# Patient Record
Sex: FEMALE | Race: WHITE | NOT HISPANIC OR LATINO | Employment: PART TIME | URBAN - METROPOLITAN AREA
[De-identification: names, ages, dates, MRNs, and addresses within clinical notes are randomized per-mention and may not be internally consistent; named-entity substitution may affect disease eponyms.]

---

## 2022-06-01 ENCOUNTER — APPOINTMENT (EMERGENCY)
Dept: ULTRASOUND IMAGING | Facility: HOSPITAL | Age: 22
DRG: 948 | End: 2022-06-01
Payer: COMMERCIAL

## 2022-06-01 ENCOUNTER — HOSPITAL ENCOUNTER (INPATIENT)
Facility: HOSPITAL | Age: 22
LOS: 1 days | Discharge: HOME | DRG: 948 | End: 2022-06-03
Attending: EMERGENCY MEDICINE | Admitting: GENERAL PRACTICE
Payer: COMMERCIAL

## 2022-06-01 ENCOUNTER — APPOINTMENT (EMERGENCY)
Dept: CT IMAGING | Facility: HOSPITAL | Age: 22
DRG: 948 | End: 2022-06-01
Payer: COMMERCIAL

## 2022-06-01 DIAGNOSIS — R10.11 RUQ PAIN: Primary | ICD-10-CM

## 2022-06-01 DIAGNOSIS — R74.01 TRANSAMINITIS: ICD-10-CM

## 2022-06-01 DIAGNOSIS — R10.10 UPPER ABDOMINAL PAIN: ICD-10-CM

## 2022-06-01 DIAGNOSIS — R10.84 GENERALIZED ABDOMINAL PAIN: ICD-10-CM

## 2022-06-01 LAB
ALBUMIN SERPL BCP-MCNC: 4.4 G/DL (ref 3.5–5)
ALP SERPL-CCNC: 101 U/L (ref 34–104)
ALT SERPL W P-5'-P-CCNC: 65 U/L (ref 7–52)
ANION GAP SERPL CALCULATED.3IONS-SCNC: 7 MMOL/L (ref 4–13)
AST SERPL W P-5'-P-CCNC: 136 U/L (ref 13–39)
BACTERIA UR QL AUTO: NORMAL /HPF
BASOPHILS # BLD AUTO: 0.03 THOUSANDS/ΜL (ref 0–0.1)
BASOPHILS NFR BLD AUTO: 0 % (ref 0–1)
BILIRUB SERPL-MCNC: 1 MG/DL (ref 0.2–1)
BILIRUB UR QL STRIP: NEGATIVE
BUN SERPL-MCNC: 10 MG/DL (ref 5–25)
CALCIUM SERPL-MCNC: 9.1 MG/DL (ref 8.4–10.2)
CHLORIDE SERPL-SCNC: 103 MMOL/L (ref 96–108)
CLARITY UR: CLEAR
CO2 SERPL-SCNC: 26 MMOL/L (ref 21–32)
COLOR UR: YELLOW
CREAT SERPL-MCNC: 0.72 MG/DL (ref 0.6–1.3)
D DIMER PPP FEU-MCNC: 1.75 UG/ML FEU
EOSINOPHIL # BLD AUTO: 0.09 THOUSAND/ΜL (ref 0–0.61)
EOSINOPHIL NFR BLD AUTO: 1 % (ref 0–6)
ERYTHROCYTE [DISTWIDTH] IN BLOOD BY AUTOMATED COUNT: 12.9 % (ref 11.6–15.1)
EXT PREG TEST URINE: NEGATIVE
EXT. CONTROL ED NAV: NORMAL
FLUAV RNA RESP QL NAA+PROBE: NEGATIVE
FLUBV RNA RESP QL NAA+PROBE: NEGATIVE
GFR SERPL CREATININE-BSD FRML MDRD: 120 ML/MIN/1.73SQ M
GLUCOSE SERPL-MCNC: 89 MG/DL (ref 65–140)
GLUCOSE UR STRIP-MCNC: NEGATIVE MG/DL
HCT VFR BLD AUTO: 43.7 % (ref 34.8–46.1)
HGB BLD-MCNC: 14.5 G/DL (ref 11.5–15.4)
HGB UR QL STRIP.AUTO: NEGATIVE
IMM GRANULOCYTES # BLD AUTO: 0.07 THOUSAND/UL (ref 0–0.2)
IMM GRANULOCYTES NFR BLD AUTO: 1 % (ref 0–2)
KETONES UR STRIP-MCNC: NEGATIVE MG/DL
LEUKOCYTE ESTERASE UR QL STRIP: ABNORMAL
LIPASE SERPL-CCNC: 26 U/L (ref 11–82)
LYMPHOCYTES # BLD AUTO: 1.57 THOUSANDS/ΜL (ref 0.6–4.47)
LYMPHOCYTES NFR BLD AUTO: 12 % (ref 14–44)
MCH RBC QN AUTO: 28.9 PG (ref 26.8–34.3)
MCHC RBC AUTO-ENTMCNC: 33.2 G/DL (ref 31.4–37.4)
MCV RBC AUTO: 87 FL (ref 82–98)
MONOCYTES # BLD AUTO: 0.64 THOUSAND/ΜL (ref 0.17–1.22)
MONOCYTES NFR BLD AUTO: 5 % (ref 4–12)
NEUTROPHILS # BLD AUTO: 11.1 THOUSANDS/ΜL (ref 1.85–7.62)
NEUTS SEG NFR BLD AUTO: 81 % (ref 43–75)
NITRITE UR QL STRIP: NEGATIVE
NON-SQ EPI CELLS URNS QL MICRO: NORMAL /HPF
NRBC BLD AUTO-RTO: 0 /100 WBCS
PH UR STRIP.AUTO: 5.5 [PH] (ref 4.5–8)
PLATELET # BLD AUTO: 212 THOUSANDS/UL (ref 149–390)
PMV BLD AUTO: 8.8 FL (ref 8.9–12.7)
POTASSIUM SERPL-SCNC: 4.9 MMOL/L (ref 3.5–5.3)
PROT SERPL-MCNC: 7.4 G/DL (ref 6.4–8.4)
PROT UR STRIP-MCNC: NEGATIVE MG/DL
RBC # BLD AUTO: 5.01 MILLION/UL (ref 3.81–5.12)
RBC #/AREA URNS AUTO: NORMAL /HPF
RSV RNA RESP QL NAA+PROBE: NEGATIVE
SARS-COV-2 RNA RESP QL NAA+PROBE: NEGATIVE
SODIUM SERPL-SCNC: 136 MMOL/L (ref 135–147)
SP GR UR STRIP.AUTO: 1.02 (ref 1–1.03)
UROBILINOGEN UR QL STRIP.AUTO: 0.2 E.U./DL
WBC # BLD AUTO: 13.5 THOUSAND/UL (ref 4.31–10.16)
WBC #/AREA URNS AUTO: NORMAL /HPF

## 2022-06-01 PROCEDURE — 85025 COMPLETE CBC W/AUTO DIFF WBC: CPT | Performed by: EMERGENCY MEDICINE

## 2022-06-01 PROCEDURE — 96374 THER/PROPH/DIAG INJ IV PUSH: CPT

## 2022-06-01 PROCEDURE — 76705 ECHO EXAM OF ABDOMEN: CPT | Performed by: EMERGENCY MEDICINE

## 2022-06-01 PROCEDURE — 96375 TX/PRO/DX INJ NEW DRUG ADDON: CPT

## 2022-06-01 PROCEDURE — 99284 EMERGENCY DEPT VISIT MOD MDM: CPT | Performed by: EMERGENCY MEDICINE

## 2022-06-01 PROCEDURE — 85379 FIBRIN DEGRADATION QUANT: CPT | Performed by: EMERGENCY MEDICINE

## 2022-06-01 PROCEDURE — 36415 COLL VENOUS BLD VENIPUNCTURE: CPT | Performed by: EMERGENCY MEDICINE

## 2022-06-01 PROCEDURE — 74176 CT ABD & PELVIS W/O CONTRAST: CPT

## 2022-06-01 PROCEDURE — 83690 ASSAY OF LIPASE: CPT | Performed by: EMERGENCY MEDICINE

## 2022-06-01 PROCEDURE — 81025 URINE PREGNANCY TEST: CPT | Performed by: EMERGENCY MEDICINE

## 2022-06-01 PROCEDURE — 80053 COMPREHEN METABOLIC PANEL: CPT | Performed by: EMERGENCY MEDICINE

## 2022-06-01 PROCEDURE — 0241U HB NFCT DS VIR RESP RNA 4 TRGT: CPT

## 2022-06-01 PROCEDURE — 81001 URINALYSIS AUTO W/SCOPE: CPT

## 2022-06-01 PROCEDURE — 96361 HYDRATE IV INFUSION ADD-ON: CPT

## 2022-06-01 PROCEDURE — 99285 EMERGENCY DEPT VISIT HI MDM: CPT

## 2022-06-01 PROCEDURE — G1004 CDSM NDSC: HCPCS

## 2022-06-01 PROCEDURE — 76705 ECHO EXAM OF ABDOMEN: CPT

## 2022-06-01 PROCEDURE — 99204 OFFICE O/P NEW MOD 45 MIN: CPT | Performed by: PHYSICIAN ASSISTANT

## 2022-06-01 RX ORDER — KETOROLAC TROMETHAMINE 30 MG/ML
15 INJECTION, SOLUTION INTRAMUSCULAR; INTRAVENOUS ONCE
Status: COMPLETED | OUTPATIENT
Start: 2022-06-01 | End: 2022-06-01

## 2022-06-01 RX ORDER — PAROXETINE 30 MG/1
30 TABLET, FILM COATED ORAL EVERY MORNING
COMMUNITY

## 2022-06-01 RX ORDER — MORPHINE SULFATE 4 MG/ML
4 INJECTION, SOLUTION INTRAMUSCULAR; INTRAVENOUS ONCE
Status: COMPLETED | OUTPATIENT
Start: 2022-06-01 | End: 2022-06-01

## 2022-06-01 RX ORDER — ONDANSETRON 2 MG/ML
4 INJECTION INTRAMUSCULAR; INTRAVENOUS EVERY 6 HOURS PRN
Status: DISCONTINUED | OUTPATIENT
Start: 2022-06-01 | End: 2022-06-03 | Stop reason: HOSPADM

## 2022-06-01 RX ORDER — PAROXETINE HYDROCHLORIDE 20 MG/1
30 TABLET, FILM COATED ORAL EVERY MORNING
Status: DISCONTINUED | OUTPATIENT
Start: 2022-06-02 | End: 2022-06-03 | Stop reason: HOSPADM

## 2022-06-01 RX ORDER — DIPHENHYDRAMINE HCL 25 MG
25 TABLET ORAL ONCE
Status: COMPLETED | OUTPATIENT
Start: 2022-06-01 | End: 2022-06-01

## 2022-06-01 RX ORDER — SIMETHICONE 80 MG
80 TABLET,CHEWABLE ORAL 4 TIMES DAILY PRN
Status: DISCONTINUED | OUTPATIENT
Start: 2022-06-01 | End: 2022-06-03 | Stop reason: HOSPADM

## 2022-06-01 RX ADMIN — SODIUM CHLORIDE 1000 ML: 0.9 INJECTION, SOLUTION INTRAVENOUS at 12:30

## 2022-06-01 RX ADMIN — DIPHENHYDRAMINE HCL 25 MG: 25 TABLET, COATED ORAL at 14:12

## 2022-06-01 RX ADMIN — KETOROLAC TROMETHAMINE 15 MG: 30 INJECTION, SOLUTION INTRAMUSCULAR at 12:29

## 2022-06-01 RX ADMIN — MORPHINE SULFATE 4 MG: 4 INJECTION INTRAVENOUS at 14:24

## 2022-06-01 NOTE — ED PROVIDER NOTES
History  Chief Complaint   Patient presents with    Abdominal Pain     [facility], sent here for further work up       History provided by:  Medical records, patient and relative   used: No    Abdominal Pain  Pain location:  RUQ  Pain quality: sharp and stabbing    Pain radiates to:  Does not radiate  Pain severity:  Severe  Onset quality:  Sudden  Duration:  3 hours  Timing:  Constant  Progression:  Unchanged  Chronicity:  New  Context: not previous surgeries, not sick contacts, not suspicious food intake and not trauma    Relieved by:  Nothing  Worsened by:  Nothing  Ineffective treatments:  None tried  Associated symptoms: nausea    Associated symptoms: no anorexia, no chest pain, no chills, no constipation, no cough, no diarrhea, no dysuria, no fever, no hematemesis, no hematochezia, no hematuria, no melena, no shortness of breath, no sore throat, no vaginal discharge and no vomiting    Risk factors: not pregnant and no recent hospitalization        Prior to Admission Medications   Prescriptions Last Dose Informant Patient Reported? Taking? PARoxetine (PAXIL) 30 mg tablet 6/1/2022 at Unknown time  Yes Yes   Sig: Take 30 mg by mouth every morning      Facility-Administered Medications: None       History reviewed  No pertinent past medical history  History reviewed  No pertinent surgical history  History reviewed  No pertinent family history  I have reviewed and agree with the history as documented  E-Cigarette/Vaping     E-Cigarette/Vaping Substances     Social History     Tobacco Use    Smoking status: Never Smoker    Smokeless tobacco: Never Used   Substance Use Topics    Alcohol use: Not Currently    Drug use: Not Currently       Review of Systems   Constitutional: Negative for chills and fever  HENT: Negative for congestion, rhinorrhea and sore throat  Eyes: Negative for visual disturbance     Respiratory: Negative for cough and shortness of breath  Cardiovascular: Negative for chest pain  Gastrointestinal: Positive for abdominal pain and nausea  Negative for anorexia, constipation, diarrhea, hematemesis, hematochezia, melena and vomiting  Genitourinary: Negative for dysuria, frequency, hematuria, vaginal discharge and vaginal pain  Skin: Negative for rash  Neurological: Negative for weakness and numbness  All other systems reviewed and are negative  Physical Exam  Physical Exam  Vitals and nursing note reviewed  Constitutional:       General: She is in acute distress  Appearance: She is well-developed  She is not diaphoretic  HENT:      Head: Normocephalic and atraumatic  Mouth/Throat:      Mouth: Mucous membranes are moist    Eyes:      General: No scleral icterus  Conjunctiva/sclera: Conjunctivae normal       Pupils: Pupils are equal, round, and reactive to light  Cardiovascular:      Rate and Rhythm: Normal rate and regular rhythm  Heart sounds: Normal heart sounds  No murmur heard  Pulmonary:      Effort: Pulmonary effort is normal  No respiratory distress  Breath sounds: Normal breath sounds  Abdominal:      General: Abdomen is flat  Palpations: Abdomen is soft  Tenderness: There is abdominal tenderness in the right upper quadrant  There is no right CVA tenderness, left CVA tenderness, guarding or rebound  Negative signs include Floyd's sign  Musculoskeletal:         General: No deformity  Normal range of motion  Cervical back: Normal range of motion  Skin:     General: Skin is warm and dry  Capillary Refill: Capillary refill takes less than 2 seconds  Neurological:      Mental Status: She is alert and oriented to person, place, and time  Psychiatric:         Mood and Affect: Mood is anxious  Behavior: Behavior normal          Thought Content:  Thought content normal          Judgment: Judgment normal          Vital Signs  ED Triage Vitals [06/01/22 1115]   Temperature Pulse Respirations Blood Pressure SpO2   97 9 °F (36 6 °C) 80 18 125/64 97 %      Temp Source Heart Rate Source Patient Position - Orthostatic VS BP Location FiO2 (%)   Oral Monitor Lying Left arm --      Pain Score       10 - Worst Possible Pain           Vitals:    06/01/22 1115 06/01/22 1428   BP: 125/64 124/78   Pulse: 80 89   Patient Position - Orthostatic VS: Lying Lying         Visual Acuity      ED Medications  Medications   ketorolac (TORADOL) injection 15 mg (15 mg Intravenous Given 6/1/22 1229)   sodium chloride 0 9 % bolus 1,000 mL (0 mL Intravenous Stopped 6/1/22 1500)   diphenhydrAMINE (BENADRYL) tablet 25 mg (25 mg Oral Given 6/1/22 1412)   morphine (PF) 4 mg/mL injection 4 mg (4 mg Intravenous Given 6/1/22 1424)       Diagnostic Studies  Results Reviewed     Procedure Component Value Units Date/Time    Lipase [[id number]]  (Normal) Collected: 06/01/22 1148    Lab Status: Final result Specimen: Blood from Arm, Right Updated: 06/01/22 1227     Lipase 26 u/L     Comprehensive metabolic panel [[id number]]  (Abnormal) Collected: 06/01/22 1148    Lab Status: Final result Specimen: Blood from Arm, Right Updated: 06/01/22 1227     Sodium 136 mmol/L      Potassium 4 9 mmol/L      Chloride 103 mmol/L      CO2 26 mmol/L      ANION GAP 7 mmol/L      BUN 10 mg/dL      Creatinine 0 72 mg/dL      Glucose 89 mg/dL      Calcium 9 1 mg/dL       U/L      ALT 65 U/L      Alkaline Phosphatase 101 U/L      Total Protein 7 4 g/dL      Albumin 4 4 g/dL      Total Bilirubin 1 00 mg/dL      eGFR 120 ml/min/1 73sq m     Narrative:      [place] guidelines for Chronic Kidney Disease (CKD):     Stage 1 with normal or high GFR (GFR > 90 mL/min/1 73 square meters)    Stage 2 Mild CKD (GFR = 60-89 mL/min/1 73 square meters)    Stage 3A Moderate CKD (GFR = 45-59 mL/min/1 73 square meters)    Stage 3B Moderate CKD (GFR = 30-44 mL/min/1 73 square meters)    Stage 4 Severe CKD (GFR = 15-29 mL/min/1 73 square meters)    Stage 5 End Stage CKD (GFR <15 mL/min/1 73 square meters)  Note: GFR calculation is accurate only with a steady state creatinine    Urine Microscopic [[id number]]  (Normal) Collected: 06/01/22 1150    Lab Status: Final result Specimen: Urine, Clean Catch Updated: 06/01/22 1221     RBC, UA 0-1 /hpf      WBC, UA 2-4 /hpf      Epithelial Cells Occasional /hpf      Bacteria, UA Occasional /hpf     CBC and differential [[id number]]  (Abnormal) Collected: 06/01/22 1148    Lab Status: Final result Specimen: Blood from Arm, Right Updated: 06/01/22 1201     WBC 13 50 Thousand/uL      RBC 5 01 Million/uL      Hemoglobin 14 5 g/dL      Hematocrit 43 7 %      MCV 87 fL      MCH 28 9 pg      MCHC 33 2 g/dL      RDW 12 9 %      MPV 8 8 fL      Platelets 643 Thousands/uL      nRBC 0 /100 WBCs      Neutrophils Relative 81 %      Immat GRANS % 1 %      Lymphocytes Relative 12 %      Monocytes Relative 5 %      Eosinophils Relative 1 %      Basophils Relative 0 %      Neutrophils Absolute 11 10 Thousands/µL      Immature Grans Absolute 0 07 Thousand/uL      Lymphocytes Absolute 1 57 Thousands/µL      Monocytes Absolute 0 64 Thousand/µL      Eosinophils Absolute 0 09 Thousand/µL      Basophils Absolute 0 03 Thousands/µL     POCT pregnancy, urine [[id number]]  (Normal) Resulted: 06/01/22 1153    Lab Status: Final result Updated: 06/01/22 1154     EXT PREG TEST UR (Ref: Negative) Negative     Control VAlid    Urine Macroscopic, POC [[id number]]  (Abnormal) Collected: 06/01/22 1150    Lab Status: Final result Specimen: Urine Updated: 06/01/22 1152     Color, UA Yellow     Clarity, UA Clear     pH, UA 5 5     Leukocytes, UA Small     Nitrite, UA Negative     Protein, UA Negative mg/dl      Glucose, UA Negative mg/dl      Ketones, UA Negative mg/dl      Urobilinogen, UA 0 2 E U /dl      Bilirubin, UA Negative     Blood, UA Negative     Specific Gravity, UA 1 020    Narrative:      CLINITEK RESULT US right upper quadrant   Final Result by Jeremy Mcginnis MD (06/01 1344)      Minimal gallbladder wall thickening without gallstones or dilatation of the gallbladder  Finding most likely is related to underlying liver disease and is not suggestive of acute cholecystitis  Workstation performed: [id number]         CT abdomen pelvis wo contrast    (Results Pending)              Procedures  POC Biliary US    Date/Time: 6/1/2022 4:05 PM  Performed by: Penelope Kenyon MD  Authorized by: Penelope Kenyon MD     Patient location:  ED  Performed by: Attending  Other Assisting Provider: No    Procedure details:     Exam Type:  Diagnostic    Assessment for:  Cholecystitis and cholelithiasis    Views obtained: gallbladder (transverse and longitudinal), common bile duct and portal triad      Image quality: diagnostic      Image availability:  Images available in PACS  Findings:     Cholelithiasis: identified      Common bile duct:  Normal    Gallbladder wall:  Abnormal    Gallbladder wall thickened (>3 mm): yes      Pericholecystic fluid: not identified      Sonographic Floyd's sign: negative    Interpretation:     Biliary ultrasound impressions: indeterminate               ED Course  ED Course as of 06/01/22 1602   Wed Jun 01, 2022   1155 POCT pregnancy, urine  Not preg   1155 Urine Macroscopic, POC(!)  Small LE, otherwise wnl   1203 CBC and differential(!)  Mild leukocytosis reassuring diff, normal H/H, normal platelets  1221 Urine Microscopic  wnl   1238 Lipase: 26  wnl   1238 Comprehensive metabolic panel(!)  Minimal elevation of AST/ALT, T Bili wnl   1238 Lipase: 26   1347 US right upper quadrant  IMPRESSION:     Minimal gallbladder wall thickening without gallstones or dilatation of the gallbladder  Finding most likely is related to underlying liver disease and is not suggestive of acute cholecystitis      Workstation performed: [id number]      1355 Improved but persistent pain    Will consult surgery, possible passed stone? [place]?  Pt also developed some swelling just below left eye, no swelling lips/tongue/wheezing/sob or other rashes  Will give benadryl  80 Seen by surgery, possible passed stone versus primary liver finding  No intervention on their part  Will reassess  1529 Improved but persistent pain, will PO challenge and re-eval     1600 Pt with no change  Will CT                                               MDM  Number of Diagnoses or Management Options  RUQ pain  Diagnosis management comments: Signed out to Dr Daisy Gabriel  a lowpending CT imaging  DIspo per results and re-eval         Amount and/or Complexity of Data Reviewed  Clinical lab tests: ordered and reviewed  Tests in the radiology section of CPT®: ordered and reviewed  Tests in the medicine section of CPT®: ordered and reviewed  Review and summarize past medical records: yes  Discuss the patient with other providers: yes  Independent visualization of images, tracings, or specimens: yes    Risk of Complications, Morbidity, and/or Mortality  Presenting problems: moderate  Diagnostic procedures: moderate  Management options: moderate    Patient Progress  Patient progress: stable      Disposition  Final diagnoses:   RUQ pain     Time reflects when diagnosis was documented in both MDM as applicable and the Disposition within this note     Time User Action Codes Description Comment    6/1/2022  3:22 PM Chris Quevedo Add [R10 11] RUQ pain       ED Disposition     None      Follow-up Information    None         Patient's Medications   Discharge Prescriptions    No medications on file       No discharge procedures on file      PDMP Review     None          ED Provider  Electronically Signed by           Hillary Lema MD  06/01/22 2819

## 2022-06-01 NOTE — CONSULTS
Consultation -General Surgery  Primitivo Skiff 22 y o  female MRN: [id number]  Unit/Bed#: [facility] Encounter: [id number]            ASSESSMENT:  Patient is a 22year old female with complaints of epigastric and right upper quadrant pain that began this morning  Right upper quadrant US does not suggest acute cholecystitis  Pain is unlikely biliary in origin  RUQ US - Minimal gallbladder wall thickening without gallstones or dilatation of the gallbladder  Finding most likely is related to underlying liver disease and is not suggestive of acute cholecystitis  AST - 136, ALT - 65  Alk phos - 101, T  Bili - 1 00  WBC - 13 50     Plan:  - recommend medical evaluation for further work-up of slightly elevated AST/ALT  - no surgical intervention necessary at this time  - discussed with Dr Phi Gregory      Reason for Consult / Principal Problem:    HPI: Primitivo Skiff is a 22y o  year old female who presents with epigastric and right upper quadrant abdominal pain that began this morning  Patient states that she felt nauseated but did not vomit  She states that she had a couple episodes of diarrhea on Sunday but that has since resolved  She denies fever, chest pain, sob  She states that the pain is constant but has improved after receiving pain medication  Patient denies previous similar pain  Review of Systems   Constitutional: Negative for fever  Respiratory: Negative for shortness of breath  Cardiovascular: Negative for chest pain  Gastrointestinal: Positive for abdominal pain, diarrhea and nausea  Negative for vomiting  All other systems reviewed and are negative  Historical Information   History reviewed  No pertinent past medical history  History reviewed  No pertinent surgical history    Social History   Social History     Substance and Sexual Activity   Alcohol Use Not Currently     Social History     Substance and Sexual Activity   Drug Use Not Currently     Social History     Tobacco Use   Smoking Status Never Smoker   Smokeless Tobacco Never Used     History reviewed  No pertinent family history  Meds/Allergies     (Not in a hospital admission)    No current facility-administered medications for this encounter  Allergies   Allergen Reactions    Amoxicillin GI Intolerance       Objective     Blood pressure 124/78, pulse 89, temperature 97 9 °F (36 6 °C), temperature source Oral, resp  rate 17, height 5' 8" (1 727 m), weight 70 3 kg (155 lb), last menstrual period 05/18/2022, SpO2 99 %  Intake/Output Summary (Last 24 hours) at 6/1/2022 1556  Last data filed at 6/1/2022 1500  Gross per 24 hour   Intake 1000 ml   Output --   Net 1000 ml       PHYSICAL EXAM  Physical Exam  Vitals reviewed  Constitutional:       Appearance: Normal appearance  HENT:      Head: Normocephalic and atraumatic  Right Ear: Ear canal and external ear normal       Left Ear: Ear canal and external ear normal       Nose: Nose normal       Mouth/Throat:      Mouth: Mucous membranes are moist    Eyes:      Extraocular Movements: Extraocular movements intact  Conjunctiva/sclera: Conjunctivae normal       Pupils: Pupils are equal, round, and reactive to light  Cardiovascular:      Rate and Rhythm: Normal rate and regular rhythm  Pulses: Normal pulses  Heart sounds: Normal heart sounds  Pulmonary:      Effort: Pulmonary effort is normal       Breath sounds: Normal breath sounds  Abdominal:      General: Bowel sounds are normal       Palpations: Abdomen is soft  Tenderness: There is abdominal tenderness in the right upper quadrant and epigastric area  There is no guarding or rebound  Negative signs include Floyd's sign and McBurney's sign  Skin:     General: Skin is warm  Capillary Refill: Capillary refill takes less than 2 seconds  Neurological:      General: No focal deficit present  Mental Status: She is alert and oriented to person, place, and time     Psychiatric: Mood and Affect: Mood normal          Behavior: Behavior normal          Thought Content: Thought content normal          Judgment: Judgment normal            Lab Results: I have personally reviewed pertinent lab results  Imaging: I have personally reviewed pertinent reports  EKG, Pathology, and Other Studies: I have personally reviewed pertinent reports  Counseling / Coordination of Care  Total time spent today  15 minutes  Greater than 50% of total time was spent with the patient and / or family counseling and / or coordination of care           Samantha Hawk PA-C  6/1/2022 3:56 PM

## 2022-06-02 ENCOUNTER — APPOINTMENT (OUTPATIENT)
Dept: CT IMAGING | Facility: HOSPITAL | Age: 22
DRG: 948 | End: 2022-06-02
Payer: COMMERCIAL

## 2022-06-02 LAB
ALBUMIN SERPL BCP-MCNC: 3.6 G/DL (ref 3.5–5)
ALP SERPL-CCNC: 110 U/L (ref 34–104)
ALT SERPL W P-5'-P-CCNC: 220 U/L (ref 7–52)
ANION GAP SERPL CALCULATED.3IONS-SCNC: 6 MMOL/L (ref 4–13)
AST SERPL W P-5'-P-CCNC: 161 U/L (ref 13–39)
BILIRUB SERPL-MCNC: 0.57 MG/DL (ref 0.2–1)
BUN SERPL-MCNC: 9 MG/DL (ref 5–25)
CALCIUM SERPL-MCNC: 8.4 MG/DL (ref 8.4–10.2)
CHLORIDE SERPL-SCNC: 109 MMOL/L (ref 96–108)
CO2 SERPL-SCNC: 24 MMOL/L (ref 21–32)
CREAT SERPL-MCNC: 0.65 MG/DL (ref 0.6–1.3)
ERYTHROCYTE [DISTWIDTH] IN BLOOD BY AUTOMATED COUNT: 13.1 % (ref 11.6–15.1)
GFR SERPL CREATININE-BSD FRML MDRD: 128 ML/MIN/1.73SQ M
GLUCOSE SERPL-MCNC: 95 MG/DL (ref 65–140)
HAV IGM SER QL: NORMAL
HBV CORE IGM SER QL: NORMAL
HBV SURFACE AG SER QL: NORMAL
HCT VFR BLD AUTO: 38.8 % (ref 34.8–46.1)
HCV AB SER QL: NORMAL
HGB BLD-MCNC: 13.1 G/DL (ref 11.5–15.4)
MCH RBC QN AUTO: 29.4 PG (ref 26.8–34.3)
MCHC RBC AUTO-ENTMCNC: 33.8 G/DL (ref 31.4–37.4)
MCV RBC AUTO: 87 FL (ref 82–98)
PLATELET # BLD AUTO: 193 THOUSANDS/UL (ref 149–390)
PMV BLD AUTO: 9 FL (ref 8.9–12.7)
POTASSIUM SERPL-SCNC: 3.9 MMOL/L (ref 3.5–5.3)
PROT SERPL-MCNC: 6.2 G/DL (ref 6.4–8.4)
RBC # BLD AUTO: 4.46 MILLION/UL (ref 3.81–5.12)
SODIUM SERPL-SCNC: 139 MMOL/L (ref 135–147)
WBC # BLD AUTO: 4.62 THOUSAND/UL (ref 4.31–10.16)

## 2022-06-02 PROCEDURE — G1004 CDSM NDSC: HCPCS

## 2022-06-02 PROCEDURE — 71275 CT ANGIOGRAPHY CHEST: CPT

## 2022-06-02 PROCEDURE — 99254 IP/OBS CNSLTJ NEW/EST MOD 60: CPT | Performed by: INTERNAL MEDICINE

## 2022-06-02 PROCEDURE — 99219 PR INITIAL OBSERVATION CARE/DAY 50 MINUTES: CPT | Performed by: GENERAL PRACTICE

## 2022-06-02 PROCEDURE — 85027 COMPLETE CBC AUTOMATED: CPT

## 2022-06-02 PROCEDURE — 80053 COMPREHEN METABOLIC PANEL: CPT

## 2022-06-02 PROCEDURE — 80074 ACUTE HEPATITIS PANEL: CPT

## 2022-06-02 RX ORDER — HYDROMORPHONE HCL IN WATER/PF 6 MG/30 ML
0.2 PATIENT CONTROLLED ANALGESIA SYRINGE INTRAVENOUS 3 TIMES DAILY PRN
Status: DISCONTINUED | OUTPATIENT
Start: 2022-06-02 | End: 2022-06-03 | Stop reason: HOSPADM

## 2022-06-02 RX ORDER — PANTOPRAZOLE SODIUM 40 MG/1
40 TABLET, DELAYED RELEASE ORAL
Status: DISCONTINUED | OUTPATIENT
Start: 2022-06-02 | End: 2022-06-03 | Stop reason: HOSPADM

## 2022-06-02 RX ORDER — SODIUM CHLORIDE, SODIUM GLUCONATE, SODIUM ACETATE, POTASSIUM CHLORIDE, MAGNESIUM CHLORIDE, SODIUM PHOSPHATE, DIBASIC, AND POTASSIUM PHOSPHATE .53; .5; .37; .037; .03; .012; .00082 G/100ML; G/100ML; G/100ML; G/100ML; G/100ML; G/100ML; G/100ML
75 INJECTION, SOLUTION INTRAVENOUS CONTINUOUS
Status: DISCONTINUED | OUTPATIENT
Start: 2022-06-02 | End: 2022-06-02

## 2022-06-02 RX ADMIN — HYDROMORPHONE HYDROCHLORIDE 0.2 MG: 0.2 INJECTION, SOLUTION INTRAMUSCULAR; INTRAVENOUS; SUBCUTANEOUS at 21:54

## 2022-06-02 RX ADMIN — PAROXETINE 30 MG: 20 TABLET, FILM COATED ORAL at 09:32

## 2022-06-02 RX ADMIN — PANTOPRAZOLE SODIUM 40 MG: 40 TABLET, DELAYED RELEASE ORAL at 13:14

## 2022-06-02 RX ADMIN — IODIXANOL 70 ML: 320 INJECTION, SOLUTION INTRAVASCULAR at 01:08

## 2022-06-02 RX ADMIN — SODIUM CHLORIDE, SODIUM GLUCONATE, SODIUM ACETATE, POTASSIUM CHLORIDE, MAGNESIUM CHLORIDE, SODIUM PHOSPHATE, DIBASIC, AND POTASSIUM PHOSPHATE 75 ML/HR: .53; .5; .37; .037; .03; .012; .00082 INJECTION, SOLUTION INTRAVENOUS at 04:29

## 2022-06-02 NOTE — CONSULTS
Consultation - [address] [facility]  Tyesha Patel 22 y o  female MRN: [id number]  Unit/Bed#: S -01 Encounter: [id number]        Consults    Reason for Consult / Principal Problem: Generalized abdominal pain    HPI: Tyesha Patel is a 22y o  year old female reporting no significant medical history who presented to the emergency room yesterday with complaint of acute onset of right upper quadrant and epigastric pain with nausea, onset suddenly occurred while she was driving     She said she was actually seen in the emergency room 2 months earlier with right lower quadrant pain for which the diagnosis was not found, she was not found to have any evidence of infection was not treated with any antibiotics, she otherwise denies starting any new medications recently  She does report history of question for celiac disease that was never confirmed, and says she has H pylori and was treated for over a year ago  Her transaminases were found to be moderately elevated with preserved alkaline phosphatase and bilirubin, CT scan and ultrasound showed some gallbladder wall thickening but no evidence of gallbladder stone it is or biliary ductal dilatation  She did also recently traveled to the [facility] [place] [facility]", stayed there for a week and came back on May 16th  A viral hepatitis panel was checked and found negative  She admits to only rare use of NSAIDs, denies alcohol use  She says she had EGD a couple of years ago in [address] [place] [address]  REVIEW OF SYSTEMS:    CONSTITUTIONAL: Denies any fever, chills, or rigors  Good appetite, and no recent weight loss  HEENT: No earache or tinnitus  Denies hearing loss or visual disturbances  CARDIOVASCULAR: No chest pain or palpitations  RESPIRATORY: Denies any cough, hemoptysis, shortness of breath or dyspnea on exertion  GASTROINTESTINAL: As noted in the History of Present Illness  GENITOURINARY: No problems with urination   Denies any hematuria or dysuria  NEUROLOGIC: No dizziness or vertigo, denies headaches  MUSCULOSKELETAL: Denies any muscle or joint pain  SKIN: Denies skin rashes or itching  ENDOCRINE: Denies excessive thirst  Denies intolerance to heat or cold  PSYCHOSOCIAL: Denies depression or anxiety  Denies any recent memory loss  Historical Information   History reviewed  No pertinent past medical history  History reviewed  No pertinent surgical history  Social History   Social History     Substance and Sexual Activity   Alcohol Use Not Currently     Social History     Substance and Sexual Activity   Drug Use Not Currently     Social History     Tobacco Use   Smoking Status Never Smoker   Smokeless Tobacco Never Used     History reviewed  No pertinent family history  Meds/Allergies     Medications Prior to Admission   Medication    PARoxetine (PAXIL) 30 mg tablet     Current Facility-Administered Medications   Medication Dose Route Frequency    HYDROmorphone HCl (DILAUDID) injection 0 2 mg  0 2 mg Intravenous TID PRN    ondansetron (ZOFRAN) injection 4 mg  4 mg Intravenous Q6H PRN    pantoprazole (PROTONIX) EC tablet 40 mg  40 mg Oral Early Morning    PARoxetine (PAXIL) tablet 30 mg  30 mg Oral QAM    simethicone (MYLICON) chewable tablet 80 mg  80 mg Oral 4x Daily PRN       Allergies   Allergen Reactions    Amoxicillin GI Intolerance           Objective     Blood pressure 114/78, pulse 96, temperature (!) 100 6 °F (38 1 °C), resp  rate 16, height 5' 8" (1 727 m), weight 70 kg (154 lb 5 2 oz), last menstrual period 05/18/2022, SpO2 97 %        Intake/Output Summary (Last 24 hours) at 6/2/2022 1143  Last data filed at 6/2/2022 1127  Gross per 24 hour   Intake 1522 5 ml   Output --   Net 1522 5 ml         PHYSICAL EXAM     General Appearance:   Alert, cooperative, no distress, appears stated age    HEENT:   Normocephalic, atraumatic, anicteric      Neck:  Supple, symmetrical, trachea midline, no adenopathy;    thyroid: no enlargement/tenderness/nodules; no carotid  bruit or JVD    Lungs:   Clear to auscultation bilaterally; no rales, rhonchi or wheezing; respirations unlabored    Heart[de-identified]   S1 and S2 normal; regular rate and rhythm; no murmur, rub, or gallop     Abdomen:   Soft, non-tender, non-distended; normal bowel sounds; no masses, no organomegaly    Genitalia:   Deferred    Rectal:   Deferred    Extremities:  No cyanosis, clubbing or edema    Pulses:  2+ and symmetric all extremities    Skin:  Skin color, texture, turgor normal, no rashes or lesions    Lymph nodes:  No palpable cervical, axillary or inguinal lymphadenopathy        Lab Results:   Admission on 06/01/2022   Component Date Value    WBC 06/01/2022 13 50 (A)    RBC 06/01/2022 5 01     Hemoglobin 06/01/2022 14 5     Hematocrit 06/01/2022 43 7     MCV 06/01/2022 87     MCH 06/01/2022 28 9     MCHC 06/01/2022 33 2     RDW 06/01/2022 12 9     MPV 06/01/2022 8 8 (A)    Platelets 54/94/1835 212     nRBC 06/01/2022 0     Neutrophils Relative 06/01/2022 81 (A)    Immat GRANS % 06/01/2022 1     Lymphocytes Relative 06/01/2022 12 (A)    Monocytes Relative 06/01/2022 5     Eosinophils Relative 06/01/2022 1     Basophils Relative 06/01/2022 0     Neutrophils Absolute 06/01/2022 11 10 (A)    Immature Grans Absolute 06/01/2022 0 07     Lymphocytes Absolute 06/01/2022 1 57     Monocytes Absolute 06/01/2022 0 64     Eosinophils Absolute 06/01/2022 0 09     Basophils Absolute 06/01/2022 0 03     Sodium 06/01/2022 136     Potassium 06/01/2022 4 9     Chloride 06/01/2022 103     CO2 06/01/2022 26     ANION GAP 06/01/2022 7     BUN 06/01/2022 10     Creatinine 06/01/2022 0 72     Glucose 06/01/2022 89     Calcium 06/01/2022 9 1     AST 06/01/2022 136 (A)    ALT 06/01/2022 65 (A)    Alkaline Phosphatase 06/01/2022 101     Total Protein 06/01/2022 7 4     Albumin 06/01/2022 4 4     Total Bilirubin 06/01/2022 1 00     eGFR 06/01/2022 120     Lipase 06/01/2022 26     EXT PREG TEST UR (Ref: N* 06/01/2022 Negative     Control 06/01/2022 VAlid     Color, UA 06/01/2022 Yellow     Clarity, UA 06/01/2022 Clear     pH, UA 06/01/2022 5 5     Leukocytes, UA 06/01/2022 Small (A)    Nitrite, UA 06/01/2022 Negative     Protein, UA 06/01/2022 Negative     Glucose, UA 06/01/2022 Negative     Ketones, UA 06/01/2022 Negative     Urobilinogen, UA 06/01/2022 0 2     Bilirubin, UA 06/01/2022 Negative     Blood, UA 06/01/2022 Negative     Specific Gravity, UA 06/01/2022 1 020     RBC, UA 06/01/2022 0-1     WBC, UA 06/01/2022 2-4     Epithelial Cells 06/01/2022 Occasional     Bacteria, UA 06/01/2022 Occasional     D-Dimer, Quant 06/01/2022 1 75 (A)    SARS-CoV-2 06/01/2022 Negative     INFLUENZA A PCR 06/01/2022 Negative     INFLUENZA B PCR 06/01/2022 Negative     RSV PCR 06/01/2022 Negative     Hepatitis B Surface Ag 06/02/2022 Non-reactive     Hep A IgM 06/02/2022 Non-reactive     Hepatitis C Ab 06/02/2022 Non-reactive     Hep B C IgM 06/02/2022 Non-reactive     WBC 06/02/2022 4 62     RBC 06/02/2022 4 46     Hemoglobin 06/02/2022 13 1     Hematocrit 06/02/2022 38 8     MCV 06/02/2022 87     MCH 06/02/2022 29 4     MCHC 06/02/2022 33 8     RDW 06/02/2022 13 1     Platelets 06/36/6360 193     MPV 06/02/2022 9 0     Sodium 06/02/2022 139     Potassium 06/02/2022 3 9     Chloride 06/02/2022 109 (A)    CO2 06/02/2022 24     ANION GAP 06/02/2022 6     BUN 06/02/2022 9     Creatinine 06/02/2022 0 65     Glucose 06/02/2022 95     Calcium 06/02/2022 8 4     AST 06/02/2022 161 (A)    ALT 06/02/2022 220 (A)    Alkaline Phosphatase 06/02/2022 110 (A)    Total Protein 06/02/2022 6 2 (A)    Albumin 06/02/2022 3 6     Total Bilirubin 06/02/2022 0 57     eGFR 06/02/2022 128        Imaging Studies: I have personally reviewed pertinent reports        CT ABDOMEN AND PELVIS WITHOUT IV CONTRAST     INDICATION:   Abdominal pain, acute, nonlocalized  RUQ pain      COMPARISON:  Right upper quadrant ultrasound from earlier today      TECHNIQUE:  CT examination of the abdomen and pelvis was performed without intravenous contrast  This examination was performed without intravenous contrast in the context of the critical nationwide Omnipaque shortage  Axial, sagittal, and coronal 2D   reformatted images were created from the source data and submitted for interpretation       Radiation dose length product (DLP) for this visit:  349 mGy-cm   This examination, like all CT scans performed in the [facility], was performed utilizing techniques to minimize radiation dose exposure, including the use of iterative   reconstruction and automated exposure control       Enteric contrast was not administered       FINDINGS:     ABDOMEN     LOWER CHEST:  No clinically significant abnormality identified in the visualized lower chest      LIVER/BILIARY TREE:  Unremarkable      GALLBLADDER:  No calcified gallstones  No pericholecystic inflammatory change      SPLEEN:  Borderline splenomegaly measuring 13 3 cm      PANCREAS:  Unremarkable      ADRENAL GLANDS:  Unremarkable      KIDNEYS/URETERS:  Unremarkable  No hydronephrosis      STOMACH AND BOWEL:  Unremarkable      APPENDIX:  No findings to suggest appendicitis      ABDOMINOPELVIC CAVITY:  No ascites  No pneumoperitoneum  No lymphadenopathy      VESSELS:  Unremarkable for patient's age      PELVIS     REPRODUCTIVE ORGANS:  IUD in satisfactory position  No adnexal mass      URINARY BLADDER:  Unremarkable      ABDOMINAL WALL/INGUINAL REGIONS:  Unremarkable      OSSEOUS STRUCTURES:  No acute fracture or destructive osseous lesion      IMPRESSION:     No acute inflammatory process identified      Borderline splenomegaly        ASSESSMENT/PLAN:     1    Acute onset of epigastric/right upper quadrant pain with elevated LFTs, nausea; imaging findings showing only mild gallbladder wall thickening with no evidence of biliary ductal dilatation or gallbladder stones  Broad differential at this time, patient reported recent travel to [facility] but acute hepatitis panel including hepatitis A serology was negative    - start Protonix once daily, treat empirically for gastritis/peptic ulcer disease    -check iron panel, autoimmune serologies including SINDY, AMA, ASMA, and celiac disease profile, she reports a history of questionable celiac disease in the past    - also check CMV and EBV titers    -monitor LFTs    -if liver enzymes appear to be increasing particularly in an obstructive pattern, would consider MRCP to rule out retained CBD stone although this appears unlikely at this time    -if transaminases continue to increase and above so urologic workup is unremarkable, can then consider liver biopsy    -if patient has epigastric/right upper quadrant pain worsens or dietary tolerance worsens despite empiric treatment for peptic ulcer disease, can also consider EGD as inpatient; otherwise can consider this as outpatient if she improves      The patient was seen and examined by Dr Aditi Lechuga, all key medical decisions were made with Dr Aditi Lechuga  Thank you for allowing us to participate in the care of this pleasant patient  We will follow up with you closely

## 2022-06-02 NOTE — H&P
[address] [facility] 2000, 22 y o  female MRN: [id number]  Unit/Bed#: S -01 Encounter: [id number]  Primary Care Provider: No primary care provider on file  Date and time admitted to hospital: 6/1/2022 11:03 AM    * Generalized abdominal pain  Assessment & Plan  POA: RUQ pain with radiation to epigastric and LUQ  Elevated liver enzymes  Imaging without findings to explain patient's symptoms  Plan  · Fevers, chills and diarrhea in the setting recent travel to [facility]  · Diarrhea has resolved but patient developed acute right upper quadrant abdominal pain  · Noted with elevated liver enzymes  Hepatocellular pattern  · Will check acute hepatitis panel  Transaminitis  Assessment & Plan  Recent Labs     06/01/22  1148   *   ALT 65*   ALKPHOS 101   TBILI 1 00       EtOH: Denies use  Drug use: No recent changes in medications  No recreational drug use  Acetaminophen: No significant use  Herbal Supplements: Denies use  Imaging  · CT Abd/Pev: No acute inflammatory process identified  Borderline splenomegaly  · U/S: Minimal gallbladder wall thickening without gallstones or dilatation of the gallbladder  Finding most likely is related to underlying liver disease and is not suggestive of acute cholecystitis  There is a hepatocellular pattern in terms of the transaminitis    Plan:  · Continue to trend LFTs  · GI non ulcerogenic diet  · Acute hepatitis panel  · Continue supportive care  Leukocytosis  Assessment & Plan  · Reports fever, chills and diarrhea 2 days prior to presentation  · Afebrile hemodynamically stable presentation  · Monitor WBC, vital signs  Elevated d-dimer  Assessment & Plan  · Elevated D-dimer in the setting of travel to [facility] 2 weeks ago  · Patient is COVID vaccinated x2 and received booster vaccine  · COVID-19 negative  · Check CT PE study      VTE Pharmacologic Prophylaxis: VTE Score: 1 Low Risk (Score 0-2) - Encourage Ambulation  Code Status: Level 1 - Full Code   Discussion with family: Updated  () at bedside  Anticipated Length of Stay: Patient will be admitted on an observation basis with an anticipated length of stay of less than 2 midnights secondary to Transaminitis, abdominal pain       Chief Complaint:  Right upper quadrant abdominal pain and nausea  History of Present Illness:    Martin Cortés is a 22 y o  female with a PMH of anxiety who presents with right upper quadrant abdominal pain and nausea  Patient was initially seen in urgent care and referred to ED for further evaluation  Patient reports fevers, chills and 1 episode of diarrhea 2 days ago which resolved without intervention  Today she developed right upper quadrant pain which was sharp/stabbing in nature radiating to epigastric and left upper quadrant  Pain was 10/10 and associated with nausea but no vomiting  Pain seemed to be worsened by deep breathing  Nothing seemed to relieve the pain  In the ED patient afebrile hemodynamically stable  Labs significant for elevated liver enzymes, leukocytosis, elevated D-dimer  CT abdomen pelvis showing borderline splenomegaly otherwise no acute inflammatory process identified  Abdominal ultrasound showing minimal gallbladder wall thickening without gallstones or dilatation of gallbladder  Findings not suggestive of acute cholecystitis  When seen patient's abdominal pain had resolved with pain medicine  She reports recent travel to [facility] from 8[place]83 - 0/36/31 has been doing well since her return  Denies sick contacts  Patient is COVID-19 vaccinated and received booster vaccine  Review of Systems:  Review of Systems   Constitutional: Negative for chills and fever  HENT: Negative for ear pain and sore throat  Eyes: Negative for pain and visual disturbance  Respiratory: Negative for cough and shortness of breath  Cardiovascular: Negative for chest pain and palpitations  Gastrointestinal: Positive for abdominal pain and diarrhea (1 episode 2 days ago)  Negative for blood in stool and vomiting  Genitourinary: Negative for dysuria and hematuria  Musculoskeletal: Negative for arthralgias and back pain  Skin: Negative for color change and rash  Neurological: Negative for seizures and syncope  All other systems reviewed and are negative  Past Medical and Surgical History:   History reviewed  No pertinent past medical history  History reviewed  No pertinent surgical history  Meds/Allergies:  Prior to Admission medications    Medication Sig Start Date End Date Taking? Authorizing Provider   PARoxetine (PAXIL) 30 mg tablet Take 30 mg by mouth every morning   Yes Historical Provider, MD DONG have reviewed home medications with patient personally  Allergies: Allergies   Allergen Reactions    Amoxicillin GI Intolerance       Social History:  Marital Status: /Civil Union   Occupation:  Unknown  Patient Pre-hospital Living Situation: Home  Patient Pre-hospital Level of Mobility: walks  Patient Pre-hospital Diet Restrictions: none  Substance Use History:   Social History     Substance and Sexual Activity   Alcohol Use Not Currently     Social History     Tobacco Use   Smoking Status Never Smoker   Smokeless Tobacco Never Used     Social History     Substance and Sexual Activity   Drug Use Not Currently       Family History:  History reviewed  No pertinent family history  Physical Exam:     Vitals:   Blood Pressure: 117/72 (06/01/22 2232)  Pulse: 78 (06/01/22 2232)  Temperature: 98 6 °F (37 °C) (06/01/22 2232)  Temp Source: Oral (06/01/22 2232)  Respirations: 16 (06/01/22 2232)  Height: 5' 8" (172 7 cm) (06/01/22 1115)  Weight - Scale: 70 kg (154 lb 5 2 oz) (06/01/22 2232)  SpO2: 97 % (06/01/22 2232)    Physical Exam  Vitals and nursing note reviewed     Constitutional:       General: She is not in acute distress  Appearance: She is well-developed  She is not ill-appearing  HENT:      Head: Normocephalic and atraumatic  Mouth/Throat:      Mouth: Mucous membranes are moist    Eyes:      General:         Right eye: No discharge  Left eye: No discharge  Pupils: Pupils are equal, round, and reactive to light  Cardiovascular:      Rate and Rhythm: Normal rate and regular rhythm  Heart sounds: No murmur heard  Pulmonary:      Effort: Pulmonary effort is normal  No respiratory distress  Breath sounds: Normal breath sounds  No wheezing, rhonchi or rales  Abdominal:      Palpations: Abdomen is soft  Tenderness: There is generalized abdominal tenderness  There is no guarding or rebound  Musculoskeletal:      Cervical back: Neck supple  Right lower leg: No edema  Left lower leg: No edema  Skin:     General: Skin is warm and dry  Capillary Refill: Capillary refill takes less than 2 seconds  Neurological:      General: No focal deficit present  Mental Status: She is alert and oriented to person, place, and time     Psychiatric:         Mood and Affect: Mood normal          Behavior: Behavior normal          Additional Data:     Lab Results:  Results from last 7 days   Lab Units 06/01/22  1148   WBC Thousand/uL 13 50*   HEMOGLOBIN g/dL 14 5   HEMATOCRIT % 43 7   PLATELETS Thousands/uL 212   NEUTROS PCT % 81*   LYMPHS PCT % 12*   MONOS PCT % 5   EOS PCT % 1     Results from last 7 days   Lab Units 06/01/22  1148   SODIUM mmol/L 136   POTASSIUM mmol/L 4 9   CHLORIDE mmol/L 103   CO2 mmol/L 26   BUN mg/dL 10   CREATININE mg/dL 0 72   ANION GAP mmol/L 7   CALCIUM mg/dL 9 1   ALBUMIN g/dL 4 4   TOTAL BILIRUBIN mg/dL 1 00   ALK PHOS U/L 101   ALT U/L 65*   AST U/L 136*   GLUCOSE RANDOM mg/dL 89                       Imaging: Reviewed radiology reports from this admission including: abdominal/pelvic CT and ultrasound(s)  CT abdomen pelvis wo contrast   Final Result by Bebo Bravo MD (06/01 1655)      No acute inflammatory process identified  Borderline splenomegaly  Workstation performed: [id number]         US right upper quadrant   Final Result by Garett Osuna MD (06/01 1344)      Minimal gallbladder wall thickening without gallstones or dilatation of the gallbladder  Finding most likely is related to underlying liver disease and is not suggestive of acute cholecystitis  Workstation performed: [id number]         CTA chest pe study    (Results Pending)       EKG and Other Studies Reviewed on Admission:   · EKG: No EKG obtained  ** Please Note: This note has been constructed using a voice recognition system   **

## 2022-06-02 NOTE — PROGRESS NOTES
[facility]  Progress Note Edvin Ham 2000, 22 y o  female MRN: [id number]  Unit/Bed#: S -01 Encounter: [id number]  Primary Care Provider: No primary care provider on file  Date and time admitted to hospital: 6/1/2022 11:03 AM    * Generalized abdominal pain  Assessment & Plan  POA: RUQ pain with radiation to epigastric and LUQ  Fevers, chills and diarrhea in the setting recent travel to [facility]  Elevated liver enzymes  Imaging without findings to explain patient's symptoms  Hepatitis panel negative    Plan  · GI on board-recommendations appreciated  · Patient started on Protonix daily for empiric treatment of gastritis/PUD  · Evaluation of transaminitis as below  · Continue pain management            Transaminitis  Assessment & Plan  Recent Labs     06/01/22  1148 06/02/22  0454   * 161*   ALT 65* 220*   ALKPHOS 101 110*   TBILI 1 00 0 57       EtOH: Denies use  Drug use: No recent changes in medications  No recreational drug use  Acetaminophen: No significant use  Herbal Supplements: Denies use  Imaging  · CT Abd/Pev: No acute inflammatory process identified  Borderline splenomegaly  · U/S: Minimal gallbladder wall thickening without gallstones or dilatation of the gallbladder  Finding most likely is related to underlying liver disease and is not suggestive of acute cholecystitis  There is a hepatocellular pattern in terms of the transaminitis  Hepatitis panel negative    Plan:  · GI on board-recommendations appreciated  · Autoimmune serologies currently pending, including celiac disease profile  · CMV and EBV titers pending  · Will consider MRCP if liver enzymes increasing in obstructive pattern  · If inconclusive, we will consider liver biopsy  · Continue trending LFTs  · GI non ulcerogenic diet  · Continue supportive care        Anxiety  Assessment & Plan  Continue home Paroxetine    Leukocytosis  Assessment & Plan  · Reports fever, chills and diarrhea 2 days prior to presentation  · Afebrile hemodynamically stable presentation  · Monitor WBC, vital signs  · Trend fever curves    Elevated d-dimer  Assessment & Plan  · Elevated D-dimer in the setting of travel to [facility] 2 weeks ago  · Patient is COVID vaccinated x2 and received booster vaccine  · COVID-19 negative  · CTA negative  · No leg swelling, tenderness or erythema noted  · Will continue monitoring for signs of DVT/PE      VTE Pharmacologic Prophylaxis: VTE Score: 1 Low Risk (Score 0-2) - Encourage Ambulation  Patient Centered Rounds: I performed bedside rounds with nursing staff today  Discussions with Specialists or Other Care Team Provider:  Gastroenterology    Education and Discussions with Family / Patient: Updated  () at bedside  Current Length of Stay: 0 day(s)  Current Patient Status: Observation   Discharge Plan: Anticipate discharge in 24-48 hrs to home  Code Status: Level 1 - Full Code    Subjective:   Patient found laying in bed, not in acute distress  Endorses mild RUQ pain that she reports is much improved from earlier  She denied chest pain, SoB, palpitations, changes in urinary or bowel habits, lower extremity edema  She had no other complaints today  Objective:     Vitals:   Temp (24hrs), Av °F (37 2 °C), Min:97 9 °F (36 6 °C), Max:100 6 °F (38 1 °C)    Temp:  [97 9 °F (36 6 °C)-100 6 °F (38 1 °C)] 98 7 °F (37 1 °C)  HR:  [74-96] 74  Resp:  [16-18] 18  BP: ()/(55-79) 114/79  SpO2:  [95 %-97 %] 96 %  Body mass index is 23 46 kg/m²  Input and Output Summary (last 24 hours): Intake/Output Summary (Last 24 hours) at 2022 1538  Last data filed at 2022 1127  Gross per 24 hour   Intake 522 5 ml   Output --   Net 522 5 ml       Physical Exam:   Physical Exam  Vitals and nursing note reviewed  Constitutional:       General: She is not in acute distress  Appearance: Normal appearance   She is not ill-appearing, toxic-appearing or diaphoretic  HENT:      Head: Normocephalic and atraumatic  Nose: Nose normal       Mouth/Throat:      Mouth: Mucous membranes are moist       Pharynx: Oropharynx is clear  Eyes:      General: No scleral icterus  Right eye: No discharge  Left eye: No discharge  Extraocular Movements: Extraocular movements intact  Conjunctiva/sclera: Conjunctivae normal    Cardiovascular:      Rate and Rhythm: Normal rate and regular rhythm  Pulses: Normal pulses  Heart sounds: Normal heart sounds  No murmur heard  Pulmonary:      Effort: Pulmonary effort is normal  No respiratory distress  Breath sounds: Normal breath sounds  No wheezing, rhonchi or rales  Abdominal:      General: Abdomen is flat  Bowel sounds are normal  There is no distension  Palpations: Abdomen is soft  Tenderness: There is abdominal tenderness (RUQ and R flank)  There is no guarding or rebound  Musculoskeletal:      Cervical back: Normal range of motion and neck supple  Right lower leg: No edema  Left lower leg: No edema  Skin:     General: Skin is warm and dry  Coloration: Skin is not jaundiced or pale  Neurological:      Mental Status: She is alert and oriented to person, place, and time  Mental status is at baseline  Psychiatric:         Mood and Affect: Mood normal          Behavior: Behavior normal          Thought Content:  Thought content normal          Judgment: Judgment normal           Additional Data:     Labs:  Results from last 7 days   Lab Units 06/02/22  0454 06/01/22  1148   WBC Thousand/uL 4 62 13 50*   HEMOGLOBIN g/dL 13 1 14 5   HEMATOCRIT % 38 8 43 7   PLATELETS Thousands/uL 193 212   NEUTROS PCT %  --  81*   LYMPHS PCT %  --  12*   MONOS PCT %  --  5   EOS PCT %  --  1     Results from last 7 days   Lab Units 06/02/22  0454   SODIUM mmol/L 139   POTASSIUM mmol/L 3 9   CHLORIDE mmol/L 109*   CO2 mmol/L 24   BUN mg/dL 9 CREATININE mg/dL 0 65   ANION GAP mmol/L 6   CALCIUM mg/dL 8 4   ALBUMIN g/dL 3 6   TOTAL BILIRUBIN mg/dL 0 57   ALK PHOS U/L 110*   ALT U/L 220*   AST U/L 161*   GLUCOSE RANDOM mg/dL 95                       Lines/Drains:  Invasive Devices  Report    Peripheral Intravenous Line  Duration           Peripheral IV 06/01/22 Right Antecubital 1 day                      Imaging: Reviewed radiology reports from this admission including: abdominal/pelvic CT and CTA chest PE study, RUQ U/s    Recent Cultures (last 7 days):         Last 24 Hours Medication List:   Current Facility-Administered Medications   Medication Dose Route Frequency Provider Last Rate    HYDROmorphone  0 2 mg Intravenous TID PRN Lori Hawley MD      ondansetron  4 mg Intravenous Q6H PRN Michael Graciela, DO      pantoprazole  40 mg Oral Early Morning Chante Jorge PA-C      PARoxetine  30 mg Oral QAM Michael [place], DO      simethicone  80 mg Oral 4x Daily PRN Michael Graciela, DO          Today, Patient Was Seen By: Walter Georges MD    **Please Note: This note may have been constructed using a voice recognition system  **

## 2022-06-02 NOTE — UTILIZATION REVIEW
Initial Clinical Review  OBSERVATION  6/1/22 @ 2102 CONVERTED TO INPATIENT ADMISSION 6/3/22 @0649 DUE TO CONTINUED STAY REQUIRED TO EVALUATE AND TREAT PATIENT WITH ELEVATED LFT's WITH CONTINUED WORKUP  GI FOLLOWING   Admission: Date/Time/Statement:   Admission Orders (From admission, onward)     Ordered        06/03/22 0649  Inpatient Admission  Once            06/01/22 2102  Place in Observation  Once                      Orders Placed This Encounter   Procedures    Inpatient Admission     Standing Status:   Standing     Number of Occurrences:   1     Order Specific Question:   Level of Care     Answer:   Med Surg [16]     Order Specific Question:   Estimated length of stay     Answer:   More than 2 Midnights     Order Specific Question:   Certification     Answer:   I certify that inpatient services are medically necessary for this patient for a duration of greater than two midnights  See H&P and MD Progress Notes for additional information about the patient's course of treatment  ED Arrival Information     Expected   6/1/2022     Arrival   6/1/2022 11:03    Acuity   Urgent            Means of arrival   Ambulance    Escorted by   [facility]    Service   Hospitalist    Admission type   Urgent            Arrival complaint   Right Upper Quad pain           Chief Complaint   Patient presents with    Abdominal Pain     [facility], sent here for further work up       Initial Presentation: 22 y o  female with hx anxiety presents to ED from Urgent care with RUQ pan radiating to epigastric and left upper quadrant and nausea, associated fever, chills  Pain worse with deep breathing Pt has diarrhea 2 days ago that is now resolved   Pt recently traveled to   On exam, generalized abdom tenderness , abdomen soft  Labs-elevated liver enzymes, leukocytosis  13 5, elevated D-dimer 1 75    CTA/P shows borderline splenomegaly Abdominal ultrasound shows minimal gallbladder wall thickening without gallstones or dilatation of gallbladder  Findings not suggestive of acute cholecystitis  Pt given IVF, IV analgesics in ED  Pt admitted as OBS with abdominal pain, transaminitis-hepatocellular pattern, elevated D dimer  Plan - Check hepatitis panel, pain control, trend LFT's, non ulcerogenic diet, IVF, monitor WBC's, Obtain CTA chest Consult general surgery  General surgery- epigastric and right upper quadrant pain that began this morning  Right upper quadrant US does not suggest acute cholecystitis  Pain is unlikely biliary in origin  Recommend- medical workup of elevated LFT's, no surgical intervention at this time needed   Date 6/2  Medicine-Temp 100 6 F this am  WBC normalized today   Hepatitis panel neg  LFT's increased today from admission   Continues with RUQ abdominal pain and R flank,improved from admission  CTA chest neg for PE - elevated D dimer on admission  No leg swelling, tenderness or erythema   Pt started on PPI empirically for gastritis/PUD by GI   Autoimmune serologies ordered along w/ celiac disease profile, CMV, EBV  Continue to trend LFT's     GI consult-pt reports hx celiac disease that was never confirmed  Plan- start PPI , check iron panel, autoimmune serologies including SINDY, AMA, ASMA, and celiac disease profile  Monitor LFT's  If transaminases continue to increase and serology workup unremarkable, consider liver bx   Consider MRCP if LFT's increase in obstructive pattern  Consider EGD if RUQ pain worsensdespite empiric tx for PUD   Date 6/3 converted to IP  GI-Elevated liver tests mainly hepatocellular injury  Persistent right upper quadrant discomfort today  Occasional nausea  Has not eaten this morning  LFTs improving with AST now 51 and ALT of 135  Alkaline phosphatase has resolved  Total bilirubin remains normal  Pt may have passed a stone, gastritis and PUD also in diff of symptoms however would not cause elevated liver tests   Serology workup pending  Hold off on EGD today, appears more biliary etiology  Continue PPI  daily, consider adding carafate  Antiemetic as needed  Advance to non ulcerogenic diet as nasir, continue to trend LFT's   If persist or worsen , recommend MRI /MRCP   ED Triage Vitals [06/01/22 1115]   Temperature Pulse Respirations Blood Pressure SpO2   97 9 °F (36 6 °C) 80 18 125/64 97 %      Temp Source Heart Rate Source Patient Position - Orthostatic VS BP Location FiO2 (%)   Oral Monitor Lying Left arm --      Pain Score       10 - Worst Possible Pain          Wt Readings from Last 1 Encounters:   06/01/22 70 kg (154 lb 5 2 oz)     Additional Vital Signs:   Date/Time Temp Pulse Resp BP MAP (mmHg) SpO2   06/03/22 08:19:54 97 9 °F (36 6 °C) 65 16 110/71 84 99 %   06/02/22 21:58:48 98 3 °F (36 8 °C) 76 -- 120/77 91 96 %   06/02/22 21:58:25 98 3 °F (36 8 °C) 75 -- 120/77 91 96 %   06/02/22 14:48:18 98 7 °F (37 1 °C) 74 18 114/79 91 96 %       Date/Time Temp Pulse Resp BP MAP (mmHg) SpO2   06/02/22 07:31:37 100 6 °F (38 1 °C) Abnormal  96 16 114/78 90 97 %       Date/Time Temp Pulse Resp BP MAP (mmHg) SpO2   06/02/22 00:10:52 97 9 °F (36 6 °C) 84 16 94/55 68 97 %   06/01/22 2232 98 6 °F (37 °C) 78 16 117/72 89 97 %   06/01/22 1428 -- 89 17 124/78 -- 99 %       Pertinent Labs/Diagnostic Test Results:   CTA chest pe study   Final Result by Ro Zimmerman MD (06/02 0134)      No evidence of pulmonary embolus  No evidence of acute intrathoracic pathology  Workstation performed: [id number]         CT abdomen pelvis wo contrast   Final Result by Pebbles Gonzalez MD (06/01 1655)      No acute inflammatory process identified  Borderline splenomegaly  Workstation performed: [id number]         US right upper quadrant   Final Result by Margot Uribe MD (06/01 1344)      Minimal gallbladder wall thickening without gallstones or dilatation of the gallbladder    Finding most likely is related to underlying liver disease and is not suggestive of acute cholecystitis        Workstation performed: [id number]           Results from last 7 days   Lab Units 06/01/22  2250   SARS-COV-2  Negative     Results from last 7 days   Lab Units 06/03/22  0838 06/02/22  0454 06/01/22  1148   WBC Thousand/uL 4 69 4 62 13 50*   HEMOGLOBIN g/dL 12 6 13 1 14 5   HEMATOCRIT % 37 8 38 8 43 7   PLATELETS Thousands/uL 178 193 212   NEUTROS ABS Thousands/µL 2 18  --  11 10*         Results from last 7 days   Lab Units 06/03/22  0838 06/02/22  0454 06/01/22  1148   SODIUM mmol/L 139 139 136   POTASSIUM mmol/L 3 8 3 9 4 9   CHLORIDE mmol/L 109* 109* 103   CO2 mmol/L 23 24 26   ANION GAP mmol/L 7 6 7   BUN mg/dL 11 9 10   CREATININE mg/dL 0 68 0 65 0 72   EGFR ml/min/1 73sq m 126 128 120   CALCIUM mg/dL 8 7 8 4 9 1     Results from last 7 days   Lab Units 06/03/22  0838 06/02/22  0454 06/01/22  1148   AST U/L 51* 161* 136*   ALT U/L 135* 220* 65*   ALK PHOS U/L 97 110* 101   TOTAL PROTEIN g/dL 6 4 6 2* 7 4   ALBUMIN g/dL 3 8 3 6 4 4   TOTAL BILIRUBIN mg/dL 0 52 0 57 1 00         Results from last 7 days   Lab Units 06/03/22  0838 06/02/22  0454 06/01/22  1148   GLUCOSE RANDOM mg/dL 83 95 89               Results from last 7 days   Lab Units 06/01/22  2047   D-DIMER QUANTITATIVE ug/ml FEU 1 75*     Results from last 7 days   Lab Units 06/03/22  0744   PROTIME seconds 13 0   INR  0 98       Results from last 7 days   Lab Units 06/03/22  0744   FERRITIN ng/mL 23     Results from last 7 days   Lab Units 06/02/22  0454   HEP B S AG  Non-reactive   HEP C AB  Non-reactive   HEP B C IGM  Non-reactive     Results from last 7 days   Lab Units 06/01/22  1148   LIPASE u/L 26                 Results from last 7 days   Lab Units 06/01/22  1150   CLARITY UA  Clear   COLOR UA  Yellow   SPEC GRAV UA  1 020   PH UA  5 5   GLUCOSE UA mg/dl Negative   KETONES UA mg/dl Negative   BLOOD UA  Negative   PROTEIN UA mg/dl Negative   NITRITE UA  Negative   BILIRUBIN UA Negative   UROBILINOGEN UA E U /dl 0 2   LEUKOCYTES UA  Small*   WBC UA /hpf 2-4   RBC UA /hpf 0-1   BACTERIA UA /hpf Occasional   EPITHELIAL CELLS WET PREP /hpf Occasional     Results from last 7 days   Lab Units 06/01/22  2250   INFLUENZA A PCR  Negative   INFLUENZA B PCR  Negative   RSV PCR  Negative                 ED Treatment:   Medication Administration from 06/01/2022 1026 to 06/01/2022 2253       Date/Time Order Dose Route Action     06/01/2022 1229 ketorolac (TORADOL) injection 15 mg 15 mg Intravenous Given     06/01/2022 1230 sodium chloride 0 9 % bolus 1,000 mL 1,000 mL Intravenous New Bag     06/01/2022 1412 diphenhydrAMINE (BENADRYL) tablet 25 mg 25 mg Oral Given     06/01/2022 1424 morphine (PF) 4 mg/mL injection 4 mg 4 mg Intravenous Given        History reviewed  No pertinent past medical history  Present on Admission:  **None**      Admitting Diagnosis: Abdominal pain [R10 9]  RUQ pain [R10 11]  Upper abdominal pain [R10 10]  Age/Sex: 22 y o  female  Admission Orders:  Scheduled Medications:  PARoxetine, 30 mg, Oral, QAM  pantoprazole (PROTONIX) EC tablet 40 mg  Dose: 40 mg  Freq: Daily (early morning) Route: PO  Start: 06/02/22 1045    Continuous IV Infusions:  multi-electrolyte, 75 mL/hr, Intravenous, ContinuousEnd: 06/02/22 1010      PRN Meds:  HYDROmorphone, 0 2 mg, Intravenous, TID PRN x1 6/2, x1 6/3  ondansetron, 4 mg, Intravenous, Q6H PRN  simethicone, 80 mg, Oral, 4x Daily PRN    GI, non ulcerogenic diet   ambulate QID      IP CONSULT TO ACUTE CARE SURGERY  IP CONSULT TO GASTROENTEROLOGY    Network Utilization Review Department  ATTENTION: Please call with any questions or concerns to [phone] and carefully listen to the prompts so that you are directed to the right person   All voicemails are confidential   Tavo Leigh all requests for admission clinical reviews, approved or denied determinations and any other requests to dedicated fax number below belonging to the campus where the patient is receiving treatment   List of dedicated fax numbers for the Facilities:  [address] [facility] DENIALS (Administrative/Medical Necessity) [phone]   [address]  (Maternity/NICU/Pediatrics) [phone] [address] [facility]  [address] [place] [address] [address] [place] [address] [address] [facility] [place] [address] [phone]

## 2022-06-03 VITALS
RESPIRATION RATE: 16 BRPM | OXYGEN SATURATION: 99 % | TEMPERATURE: 97.9 F | BODY MASS INDEX: 23.39 KG/M2 | HEART RATE: 65 BPM | SYSTOLIC BLOOD PRESSURE: 110 MMHG | WEIGHT: 154.32 LBS | DIASTOLIC BLOOD PRESSURE: 71 MMHG | HEIGHT: 68 IN

## 2022-06-03 LAB
ALBUMIN SERPL BCP-MCNC: 3.8 G/DL (ref 3.5–5)
ALP SERPL-CCNC: 97 U/L (ref 34–104)
ALT SERPL W P-5'-P-CCNC: 135 U/L (ref 7–52)
ANION GAP SERPL CALCULATED.3IONS-SCNC: 7 MMOL/L (ref 4–13)
AST SERPL W P-5'-P-CCNC: 51 U/L (ref 13–39)
BASOPHILS # BLD AUTO: 0.02 THOUSANDS/ΜL (ref 0–0.1)
BASOPHILS NFR BLD AUTO: 0 % (ref 0–1)
BILIRUB SERPL-MCNC: 0.52 MG/DL (ref 0.2–1)
BUN SERPL-MCNC: 11 MG/DL (ref 5–25)
CALCIUM SERPL-MCNC: 8.7 MG/DL (ref 8.4–10.2)
CHLORIDE SERPL-SCNC: 109 MMOL/L (ref 96–108)
CO2 SERPL-SCNC: 23 MMOL/L (ref 21–32)
CREAT SERPL-MCNC: 0.68 MG/DL (ref 0.6–1.3)
EOSINOPHIL # BLD AUTO: 0.15 THOUSAND/ΜL (ref 0–0.61)
EOSINOPHIL NFR BLD AUTO: 3 % (ref 0–6)
ERYTHROCYTE [DISTWIDTH] IN BLOOD BY AUTOMATED COUNT: 12.8 % (ref 11.6–15.1)
FERRITIN SERPL-MCNC: 23 NG/ML (ref 8–388)
GFR SERPL CREATININE-BSD FRML MDRD: 126 ML/MIN/1.73SQ M
GLUCOSE SERPL-MCNC: 83 MG/DL (ref 65–140)
HCT VFR BLD AUTO: 37.8 % (ref 34.8–46.1)
HGB BLD-MCNC: 12.6 G/DL (ref 11.5–15.4)
IMM GRANULOCYTES # BLD AUTO: 0.02 THOUSAND/UL (ref 0–0.2)
IMM GRANULOCYTES NFR BLD AUTO: 0 % (ref 0–2)
INR PPP: 0.98 (ref 0.84–1.19)
IRON SATN MFR SERPL: 13 % (ref 15–50)
IRON SERPL-MCNC: 54 UG/DL (ref 50–170)
LYMPHOCYTES # BLD AUTO: 2.04 THOUSANDS/ΜL (ref 0.6–4.47)
LYMPHOCYTES NFR BLD AUTO: 44 % (ref 14–44)
MCH RBC QN AUTO: 29.2 PG (ref 26.8–34.3)
MCHC RBC AUTO-ENTMCNC: 33.3 G/DL (ref 31.4–37.4)
MCV RBC AUTO: 88 FL (ref 82–98)
MONOCYTES # BLD AUTO: 0.28 THOUSAND/ΜL (ref 0.17–1.22)
MONOCYTES NFR BLD AUTO: 6 % (ref 4–12)
NEUTROPHILS # BLD AUTO: 2.18 THOUSANDS/ΜL (ref 1.85–7.62)
NEUTS SEG NFR BLD AUTO: 47 % (ref 43–75)
NRBC BLD AUTO-RTO: 0 /100 WBCS
PLATELET # BLD AUTO: 178 THOUSANDS/UL (ref 149–390)
PMV BLD AUTO: 8.9 FL (ref 8.9–12.7)
POTASSIUM SERPL-SCNC: 3.8 MMOL/L (ref 3.5–5.3)
PROT SERPL-MCNC: 6.4 G/DL (ref 6.4–8.4)
PROTHROMBIN TIME: 13 SECONDS (ref 11.6–14.5)
RBC # BLD AUTO: 4.32 MILLION/UL (ref 3.81–5.12)
SODIUM SERPL-SCNC: 139 MMOL/L (ref 135–147)
TIBC SERPL-MCNC: 407 UG/DL (ref 250–450)
WBC # BLD AUTO: 4.69 THOUSAND/UL (ref 4.31–10.16)

## 2022-06-03 PROCEDURE — 86645 CMV ANTIBODY IGM: CPT | Performed by: PHYSICIAN ASSISTANT

## 2022-06-03 PROCEDURE — 82784 ASSAY IGA/IGD/IGG/IGM EACH: CPT | Performed by: PHYSICIAN ASSISTANT

## 2022-06-03 PROCEDURE — 85025 COMPLETE CBC W/AUTO DIFF WBC: CPT

## 2022-06-03 PROCEDURE — 86665 EPSTEIN-BARR CAPSID VCA: CPT | Performed by: PHYSICIAN ASSISTANT

## 2022-06-03 PROCEDURE — 86381 MITOCHONDRIAL ANTIBODY EACH: CPT | Performed by: PHYSICIAN ASSISTANT

## 2022-06-03 PROCEDURE — 86364 TISS TRNSGLTMNASE EA IG CLAS: CPT | Performed by: PHYSICIAN ASSISTANT

## 2022-06-03 PROCEDURE — 86038 ANTINUCLEAR ANTIBODIES: CPT | Performed by: GENERAL PRACTICE

## 2022-06-03 PROCEDURE — 86015 ACTIN ANTIBODY EACH: CPT | Performed by: PHYSICIAN ASSISTANT

## 2022-06-03 PROCEDURE — 82103 ALPHA-1-ANTITRYPSIN TOTAL: CPT | Performed by: GENERAL PRACTICE

## 2022-06-03 PROCEDURE — 83550 IRON BINDING TEST: CPT | Performed by: PHYSICIAN ASSISTANT

## 2022-06-03 PROCEDURE — 82728 ASSAY OF FERRITIN: CPT | Performed by: PHYSICIAN ASSISTANT

## 2022-06-03 PROCEDURE — 86644 CMV ANTIBODY: CPT | Performed by: PHYSICIAN ASSISTANT

## 2022-06-03 PROCEDURE — 99239 HOSP IP/OBS DSCHRG MGMT >30: CPT | Performed by: GENERAL PRACTICE

## 2022-06-03 PROCEDURE — 86231 EMA EACH IG CLASS: CPT | Performed by: PHYSICIAN ASSISTANT

## 2022-06-03 PROCEDURE — 86258 DGP ANTIBODY EACH IG CLASS: CPT | Performed by: PHYSICIAN ASSISTANT

## 2022-06-03 PROCEDURE — 83540 ASSAY OF IRON: CPT | Performed by: PHYSICIAN ASSISTANT

## 2022-06-03 PROCEDURE — 80053 COMPREHEN METABOLIC PANEL: CPT

## 2022-06-03 PROCEDURE — 82390 ASSAY OF CERULOPLASMIN: CPT | Performed by: PHYSICIAN ASSISTANT

## 2022-06-03 PROCEDURE — 99232 SBSQ HOSP IP/OBS MODERATE 35: CPT | Performed by: PHYSICIAN ASSISTANT

## 2022-06-03 PROCEDURE — 86664 EPSTEIN-BARR NUCLEAR ANTIGEN: CPT | Performed by: PHYSICIAN ASSISTANT

## 2022-06-03 PROCEDURE — 85610 PROTHROMBIN TIME: CPT | Performed by: PHYSICIAN ASSISTANT

## 2022-06-03 PROCEDURE — 86663 EPSTEIN-BARR ANTIBODY: CPT | Performed by: PHYSICIAN ASSISTANT

## 2022-06-03 RX ORDER — PANTOPRAZOLE SODIUM 40 MG/1
40 TABLET, DELAYED RELEASE ORAL
Qty: 30 TABLET | Refills: 0 | Status: SHIPPED | OUTPATIENT
Start: 2022-06-04 | End: 2022-07-22

## 2022-06-03 RX ORDER — PANTOPRAZOLE SODIUM 40 MG/1
40 TABLET, DELAYED RELEASE ORAL
Qty: 30 TABLET | Refills: 0 | Status: SHIPPED | OUTPATIENT
Start: 2022-06-04 | End: 2022-06-03 | Stop reason: SDUPTHER

## 2022-06-03 RX ORDER — ONDANSETRON 4 MG/1
4 TABLET, FILM COATED ORAL EVERY 8 HOURS PRN
Qty: 20 TABLET | Refills: 0 | Status: SHIPPED | OUTPATIENT
Start: 2022-06-03 | End: 2022-07-22 | Stop reason: ALTCHOICE

## 2022-06-03 RX ADMIN — PAROXETINE 30 MG: 20 TABLET, FILM COATED ORAL at 09:04

## 2022-06-03 RX ADMIN — HYDROMORPHONE HYDROCHLORIDE 0.2 MG: 0.2 INJECTION, SOLUTION INTRAMUSCULAR; INTRAVENOUS; SUBCUTANEOUS at 06:02

## 2022-06-03 RX ADMIN — PANTOPRAZOLE SODIUM 40 MG: 40 TABLET, DELAYED RELEASE ORAL at 05:57

## 2022-06-03 NOTE — DISCHARGE SUMMARY
[facility]  Discharge- [place] 2000, 22 y o  female MRN: [id number]  Unit/Bed#: S -01 Encounter: [id number]  Primary Care Provider: No primary care provider on file  Date and time admitted to hospital: 6/1/2022 11:03 AM    * Generalized abdominal pain  Assessment & Plan  POA: RUQ pain with radiation to epigastric and LUQ  Fevers, chills and diarrhea in the setting recent travel to [facility]  Elevated liver enzymes  Imaging without findings to explain patient's symptoms  Hepatitis panel negative    Plan  · GI on board-recommendations appreciated  · Continue Protonix the discharge  · P r n  Zofran ordered  · Repeat CMP in 3 days  · Outpatient follow-up with GI            Transaminitis  Assessment & Plan  Recent Labs     06/01/22  1148 06/02/22  0454 06/03/22  0838   * 161* 51*   ALT 65* 220* 135*   ALKPHOS 101 110* 97   TBILI 1 00 0 57 0 52       EtOH: Denies use  Drug use: No recent changes in medications  No recreational drug use  Acetaminophen: No significant use  Herbal Supplements: Denies use  Imaging  · CT Abd/Pev: No acute inflammatory process identified  Borderline splenomegaly  · U/S: Minimal gallbladder wall thickening without gallstones or dilatation of the gallbladder  Finding most likely is related to underlying liver disease and is not suggestive of acute cholecystitis  There is a hepatocellular pattern in terms of the transaminitis  Hepatitis panel negative    Plan:  · GI follow up on outpatient setting  · Repeat CMP in 3 days  · PCP follow up        [address] [facility] Paroxetine    Leukocytosis  Assessment & Plan  · Reports fever, chills and diarrhea 2 days prior to presentation  · Afebrile hemodynamically stable presentation  · Monitor WBC, vital signs    · Now resolved    Elevated d-dimer  Assessment & Plan  · Elevated D-dimer in the setting of travel to [facility] 2 weeks ago   · Patient is COVID vaccinated x2 and received booster vaccine  · COVID-19 negative  · CTA negative  · No leg swelling, tenderness or erythema noted        Medical Problems             Resolved Problems  Date Reviewed: 6/1/2022   None               Discharging Resident: Loree Hernandez MD  Discharging Attending: Karlee Saleh DO  PCP: No primary care provider on file  Admission Date:   Admission Orders (From admission, onward)     Ordered        06/03/22 0649  Inpatient Admission  Once            06/01/22 2102  Place in Observation  Once                      Discharge Date: 06/03/22    Consultations During Hospital Stay:  · Gastroenterology    Procedures Performed:   · None    Significant Findings / Test Results:   CT abdomen pelvis wo contrast    Result Date: 6/1/2022  Impression: No acute inflammatory process identified  Borderline splenomegaly  Workstation performed: [id number]     CTA chest pe study    Result Date: 6/2/2022  Impression: No evidence of pulmonary embolus  No evidence of acute intrathoracic pathology  Workstation performed: [id number]     US right upper quadrant    Result Date: 6/1/2022  Impression: Minimal gallbladder wall thickening without gallstones or dilatation of the gallbladder  Finding most likely is related to underlying liver disease and is not suggestive of acute cholecystitis  Workstation performed: [id number]       · No Chest XR results available for this patient     Results from last 7 days   Lab Units 06/03/22  0838 06/02/22  0454 06/01/22  1148   SODIUM mmol/L 139 139 136   POTASSIUM mmol/L 3 8 3 9 4 9   CHLORIDE mmol/L 109* 109* 103   CO2 mmol/L 23 24 26   ANION GAP mmol/L 7 6 7   BUN mg/dL 11 9 10   CREATININE mg/dL 0 68 0 65 0 72   CALCIUM mg/dL 8 7 8 4 9 1   ALK PHOS U/L 97 110* 101   ALT U/L 135* 220* 65*   AST U/L 51* 161* 136*   ·   Recent Labs     06/01/22  1148 06/02/22  0454 06/03/22  0838   WBC 13 50* 4 62 4 69   HGB 14 5 13 1 12 6   HCT 43 7 38 8 37 8 MCV 87 87 88   MCH 28 9 29 4 29 2   MCHC 33 2 33 8 33 3    193 178   RBC 5 01 4 46 4 32   ·   ·     Incidental Findings:   · None    Test Results Pending at Discharge (will require follow up): · Ceruloplasmin  · Anti mitochondrial antibody  · Anti smooth muscle antibody, IgG  · EBV acute panel  · SINDY screen with reflex to titer  · Alpha-1 antitrypsin  · Celiac disease antibody profile     Outpatient Tests Requested:  · CMP in 3 days    Complications:  None  Reason for Admission:  Right upper quadrant pain    Hospital Course:   Erika Canada is a 22 y o  female patient who originally presented to the hospital on 6/1/2022 due to episodic right upper quadrant pain and nausea as well as an episode of diarrhea 2 days prior to presentation  In the ED patient was afebrile and hemodynamically stable  She had tenderness to palpation of the right upper quadrant, however and no Floyd  Labs are significant for elevated liver enzymes, leukocytosis and elevated D-dimer  CT abdomen pelvis showing borderline splenomegaly otherwise no acute inflammatory process  Given elevated D-dimer a CTA with contrast was ordered showing no PE  Abdominal ultrasound with minimal gallbladder wall thickening without gallstones or dilatation of CBD  Patient was admitted to the [facility] service for further workup  On hospital day 2, hepatitis panel negative  GI was consulted, ordering further workup for the patient including autoimmune serologies, celiac disease profile, iron panel, CMV EBV titers  On hospital day 3, LFTs began trending down  Given improvement in symptoms, patient was discharged on 06/03/2022  She was advised to follow-up with GI on outpatient basis and her PCP within a week of discharge  She was also advised to obtain a complete metabolic panel in 3 days after discharge  She was sent home with a prescription for Protonix as well as p r n  Zofran      Please see above list of diagnoses and related plan for additional information  Condition at Discharge: good    Discharge Day Visit / Exam:   Subjective:  Patient on laying in bed, not in acute distress this morning, states that she still has occasional episodes of sharp right upper quadrant pain, however she states that this is nowhere near as bad as when she 1st came in  She denies chest pain, palpitations, abdominal distension, changes in urinary or bowel habits, lower extremity edema  She had no other complaints at this time  Vitals: Blood Pressure: 110/71 (06/03/22 0819)  Pulse: 65 (06/03/22 0819)  Temperature: 97 9 °F (36 6 °C) (06/03/22 0819)  Temp Source: Oral (06/01/22 2232)  Respirations: 16 (06/03/22 0819)  Height: 5' 8" (172 7 cm) (06/01/22 1115)  Weight - Scale: 70 kg (154 lb 5 2 oz) (06/01/22 2232)  SpO2: 99 % (06/03/22 0819)     Exam:   Physical Exam  Vitals and nursing note reviewed  Constitutional:       General: She is not in acute distress  Appearance: Normal appearance  She is not ill-appearing, toxic-appearing or diaphoretic  HENT:      Head: Normocephalic and atraumatic  Nose: Nose normal       Mouth/Throat:      Mouth: Mucous membranes are moist       Pharynx: Oropharynx is clear  Eyes:      General: No scleral icterus  Right eye: No discharge  Left eye: No discharge  Extraocular Movements: Extraocular movements intact  Conjunctiva/sclera: Conjunctivae normal    Cardiovascular:      Rate and Rhythm: Normal rate and regular rhythm  Pulses: Normal pulses  Heart sounds: Normal heart sounds  No murmur heard  Pulmonary:      Effort: Pulmonary effort is normal  No respiratory distress  Breath sounds: Normal breath sounds  No wheezing, rhonchi or rales  Abdominal:      General: Abdomen is flat  Bowel sounds are normal  There is no distension  Palpations: Abdomen is soft  There is no mass  Tenderness:  There is abdominal tenderness (Right upper quadrant, 4/10 dull tenderness with pressure, Floyd negative  )  There is no right CVA tenderness, left CVA tenderness, guarding or rebound  Musculoskeletal:      Cervical back: Normal range of motion and neck supple  Right lower leg: No edema  Left lower leg: No edema  Skin:     General: Skin is warm and dry  Coloration: Skin is not jaundiced or pale  Neurological:      Mental Status: She is alert and oriented to person, place, and time  Mental status is at baseline  Psychiatric:         Mood and Affect: Mood normal          Behavior: Behavior normal          Thought Content: Thought content normal          Judgment: Judgment normal           Discussion with Family: Patient declined call to   Discharge instructions/Information to patient and family:   See after visit summary for information provided to patient and family  Provisions for Follow-Up Care:  See after visit summary for information related to follow-up care and any pertinent home health orders  Disposition:   Home    Planned Readmission: None    Discharge Medications:  See after visit summary for reconciled discharge medications provided to patient and/or family        **Please Note: This note may have been constructed using a voice recognition system**

## 2022-06-03 NOTE — DISCHARGE INSTR - AVS FIRST PAGE
Dear Che Elder,     It was our pleasure to care for you here at [facility]  It is our hope that we were always able to exceed the expected standards for your care during your stay  You were hospitalized due to transaminitis  You were cared for on the [facility] [address] 4th floor by Esther Clrak MD under the service of Tracee Anderson,  with the [facility] who covers for your primary care physician (PCP), No primary care provider on file  , while you were hospitalized  If you have any questions or concerns related to this hospitalization, you may contact us at [phone]  For follow up as well as any medication refills, we recommend that you follow up with your primary care physician  A registered nurse will reach out to you by phone within a few days after your discharge to answer any additional questions that you may have after going home  However, at this time we provide for you here, the most important instructions / recommendations at discharge:     Notable Medication Adjustments -   Please start taking pantoprazole (Protonix) 40 mg, 1 tablet daily  Please take ondansetron (Zofran) 4 mg tablet, 1 tablet every 8 hours as needed for nausea  Please continue taking the rest of your medications as prescribed  Testing Required after Discharge -   Please obtain a complete metabolic panel in 3 days  An order has been placed to [facility] may do so at any [facility] lab    Important follow up information -   Please follow-up with your primary care provider within a week of discharge  Please follow-up with Gastroenterology on outpatient basis   Other Instructions -   Please contact your primary care provider, call 911 or go to the nearest emergency department for worsening symptoms  Practice Social distancing  Please practie adequate hand washing techinque  Wear a mask in public at all times    Please review this entire after visit summary as additional general instructions including medication list, appointments, activity, diet, any pertinent wound care, and other additional recommendations from your care team that may be provided for you        Sincerely,     Dionisio Fuller MD

## 2022-06-03 NOTE — PROGRESS NOTES
Progress Note - Milagro Rich 22 y o  female MRN: [id number]    Unit/Bed#: S -01 Encounter: [id number]    Assessment and Plan:     1  Elevated liver tests mainly hepatocellular injury  Patient with persistent right upper quadrant discomfort today  Occasional nausea  Has not eaten this morning  LFTs have come back with some improvement with AST now 51 and ALT of 135  Alkaline phosphatase has resolved  Total bilirubin remains normal   CBC unremarkable  Vital signs stable, afebrile  Imaging has been notable for some minimal gallbladder wall thickening however no gallstones or dilatation noted  Question whether patient may have passed a stone, gastritis and peptic ulcer disease also in differential of symptoms however would not cause elevated liver tests  -serological workup still pending   -there is mild improvement of liver tests today   -will hold off on EGD today due to persistent elevated liver tests as this appears more of a biliary etiology  -continue symptomatic treatment with PPI daily, consider addition of Carafate   -antiemetics as needed   -can advance diet to nonulcergenic GI as tolerated  -continue to monitor liver function tests  If persistent or any worsening elevation, would recommend MRI with MRCP    ----------------------------------------------------------------------------------------------------------------    Subjective:     Patient continues to have some right upper quadrant pain  She reports this is on and off  Occasional worsening with eating  Last bowel movement 2 days ago  She does report some nausea today without any vomiting  Objective:     Vitals: Blood pressure 110/71, pulse 65, temperature 97 9 °F (36 6 °C), resp  rate 16, height 5' 8" (1 727 m), weight 70 kg (154 lb 5 2 oz), last menstrual period 05/18/2022, SpO2 99 %  ,Body mass index is 23 46 kg/m²        Intake/Output Summary (Last 24 hours) at 6/3/2022 1348  Last data filed at 6/3/2022 0800  Gross per 24 hour   Intake 120 ml   Output --   Net 120 ml       Physical Exam:     General Appearance: Alert, appears stated age and cooperative  Lying comfortably in bed  Does not appear in distress  Lungs: Clear to auscultation bilaterally, no rales or rhonchi, no labored breathing/accessory muscle use  Heart: Regular rate and rhythm, S1, S2 normal, no murmur, click, rub or gallop  Abdomen: + patient reports tenderness of the right upper quadrant as well as right mid abdomen  Abdomen otherwise soft, nondistended with bowel sounds present  Extremities: No cyanosis, clubbing, or edema    Invasive Devices  Report    Peripheral Intravenous Line  Duration           Peripheral IV 06/01/22 Right Antecubital 2 days                Lab Results:  Results from last 7 days   Lab Units 06/03/22  0838   WBC Thousand/uL 4 69   HEMOGLOBIN g/dL 12 6   HEMATOCRIT % 37 8   PLATELETS Thousands/uL 178   NEUTROS PCT % 47   LYMPHS PCT % 44   MONOS PCT % 6   EOS PCT % 3     Results from last 7 days   Lab Units 06/03/22  0838   POTASSIUM mmol/L 3 8   CHLORIDE mmol/L 109*   CO2 mmol/L 23   BUN mg/dL 11   CREATININE mg/dL 0 68   CALCIUM mg/dL 8 7   ALK PHOS U/L 97   ALT U/L 135*   AST U/L 51*     Invalid input(s): BILI  Results from last 7 days   Lab Units 06/03/22  0744   INR  0 98     Results from last 7 days   Lab Units 06/01/22  1148   LIPASE u/L 26       Imaging Studies: I have personally reviewed pertinent imaging studies  CT abdomen pelvis wo contrast    Result Date: 6/1/2022  Impression: No acute inflammatory process identified  Borderline splenomegaly  Workstation performed: [id number]     CTA chest pe study    Result Date: 6/2/2022  Impression: No evidence of pulmonary embolus  No evidence of acute intrathoracic pathology  Workstation performed: [id number]     US right upper quadrant    Result Date: 6/1/2022  Impression: Minimal gallbladder wall thickening without gallstones or dilatation of the gallbladder    Finding most likely is related to underlying liver disease and is not suggestive of acute cholecystitis   Workstation performed: [id number]

## 2022-06-03 NOTE — ASSESSMENT & PLAN NOTE
Continue home Paroxetine
Continue home Paroxetine
POA: RUQ pain with radiation to epigastric and LUQ  Elevated liver enzymes  Imaging without findings to explain patient's symptoms  Plan  · Fevers, chills and diarrhea in the setting recent travel to [facility]  · Diarrhea has resolved but patient developed acute right upper quadrant abdominal pain  · Noted with elevated liver enzymes  Hepatocellular pattern  · Will check acute hepatitis panel 
POA: RUQ pain with radiation to epigastric and LUQ  Fevers, chills and diarrhea in the setting recent travel to [facility]  Elevated liver enzymes  Imaging without findings to explain patient's symptoms  Hepatitis panel negative    Plan  · GI on board-recommendations appreciated  · Continue Protonix the discharge  · P r n   Zofran ordered  · Repeat CMP in 3 days  · Outpatient follow-up with GI
POA: RUQ pain with radiation to epigastric and LUQ  Fevers, chills and diarrhea in the setting recent travel to [facility]  Elevated liver enzymes  Imaging without findings to explain patient's symptoms    Hepatitis panel negative    Plan  · GI on board-recommendations appreciated  · Patient started on Protonix daily for empiric treatment of gastritis/PUD  · Evaluation of transaminitis as below  · Continue pain management
Recent Labs     06/01/22  1148   *   ALT 65*   ALKPHOS 101   TBILI 1 00       EtOH: Denies use  Drug use: No recent changes in medications  No recreational drug use  Acetaminophen: No significant use  Herbal Supplements: Denies use  Imaging  · CT Abd/Pev: No acute inflammatory process identified  Borderline splenomegaly  · U/S: Minimal gallbladder wall thickening without gallstones or dilatation of the gallbladder  Finding most likely is related to underlying liver disease and is not suggestive of acute cholecystitis  There is a hepatocellular pattern in terms of the transaminitis    Plan:  · Continue to trend LFTs  · GI non ulcerogenic diet  · Acute hepatitis panel  · Continue supportive care 
Recent Labs     06/01/22  1148 06/02/22  0454   * 161*   ALT 65* 220*   ALKPHOS 101 110*   TBILI 1 00 0 57       EtOH: Denies use  Drug use: No recent changes in medications  No recreational drug use  Acetaminophen: No significant use  Herbal Supplements: Denies use  Imaging  · CT Abd/Pev: No acute inflammatory process identified  Borderline splenomegaly  · U/S: Minimal gallbladder wall thickening without gallstones or dilatation of the gallbladder  Finding most likely is related to underlying liver disease and is not suggestive of acute cholecystitis  There is a hepatocellular pattern in terms of the transaminitis  Hepatitis panel negative    Plan:  · GI on board-recommendations appreciated  · Autoimmune serologies currently pending, including celiac disease profile  · CMV and EBV titers pending  · Will consider MRCP if liver enzymes increasing in obstructive pattern  · If inconclusive, we will consider liver biopsy  · Continue trending LFTs  · GI non ulcerogenic diet  · Continue supportive care 
Recent Labs     06/01/22  1148 06/02/22  0454 06/03/22  0838   * 161* 51*   ALT 65* 220* 135*   ALKPHOS 101 110* 97   TBILI 1 00 0 57 0 52       EtOH: Denies use  Drug use: No recent changes in medications  No recreational drug use  Acetaminophen: No significant use  Herbal Supplements: Denies use  Imaging  · CT Abd/Pev: No acute inflammatory process identified  Borderline splenomegaly  · U/S: Minimal gallbladder wall thickening without gallstones or dilatation of the gallbladder  Finding most likely is related to underlying liver disease and is not suggestive of acute cholecystitis      There is a hepatocellular pattern in terms of the transaminitis  Hepatitis panel negative    Plan:  · GI follow up on outpatient setting  · Repeat CMP in 3 days  · PCP follow up
·    
· Elevated D-dimer in the setting of travel to [facility] 2 weeks ago  · Patient is COVID vaccinated x2 and received booster vaccine  · COVID-19 negative    · CTA negative  · No leg swelling, tenderness or erythema noted  · Will continue monitoring for signs of DVT/PE
· Elevated D-dimer in the setting of travel to [facility] 2 weeks ago  · Patient is COVID vaccinated x2 and received booster vaccine  · COVID-19 negative    · CTA negative  · No leg swelling, tenderness or erythema noted
· Elevated D-dimer in the setting of travel to [facility] 2 weeks ago  · Patient is COVID vaccinated x2 and received booster vaccine  · COVID-19 negative  · Check CT PE study 
· Reports fever, chills and diarrhea 2 days prior to presentation  · Afebrile hemodynamically stable presentation  · Monitor WBC, vital signs 
· Reports fever, chills and diarrhea 2 days prior to presentation  · Afebrile hemodynamically stable presentation  · Monitor WBC, vital signs    · Now resolved
· Reports fever, chills and diarrhea 2 days prior to presentation  · Afebrile hemodynamically stable presentation  · Monitor WBC, vital signs    · Trend fever curves
Detail Level: Zone
Initiate Treatment: pharmacy compounding accessory BID\\nSig: Apply to backs of hands and forearms BID x5 days.
Continue Regimen: Ketoconazole cream to areas daily prn

## 2022-06-04 LAB
A1AT SERPL-MCNC: 153 MG/DL (ref 100–188)
ACTIN IGG SERPL-ACNC: 6 UNITS (ref 0–19)
CERULOPLASMIN SERPL-MCNC: 23.8 MG/DL (ref 19–39)
CMV IGG SERPL IA-ACNC: <0.6 U/ML (ref 0–0.59)
CMV IGM SERPL IA-ACNC: <30 AU/ML (ref 0–29.9)
EBV NA IGG SER IA-ACNC: 29.7 U/ML (ref 0–17.9)
EBV VCA IGG SER IA-ACNC: <18 U/ML (ref 0–17.9)
EBV VCA IGM SER IA-ACNC: <36 U/ML (ref 0–35.9)
ENDOMYSIUM IGA SER QL: NEGATIVE
GLIADIN PEPTIDE IGA SER-ACNC: 4 UNITS (ref 0–19)
GLIADIN PEPTIDE IGG SER-ACNC: 8 UNITS (ref 0–19)
IGA SERPL-MCNC: 63 MG/DL (ref 87–352)
INTERPRETATION: ABNORMAL
MITOCHONDRIA M2 IGG SER-ACNC: <20 UNITS (ref 0–20)
TTG IGA SER-ACNC: <2 U/ML (ref 0–3)
TTG IGG SER-ACNC: 5 U/ML (ref 0–5)

## 2022-06-06 ENCOUNTER — APPOINTMENT (OUTPATIENT)
Dept: LAB | Facility: AMBULARY SURGERY CENTER | Age: 22
End: 2022-06-06
Payer: COMMERCIAL

## 2022-06-06 DIAGNOSIS — R74.01 TRANSAMINITIS: ICD-10-CM

## 2022-06-06 DIAGNOSIS — R10.11 RUQ PAIN: ICD-10-CM

## 2022-06-06 LAB
ALBUMIN SERPL BCP-MCNC: 3.8 G/DL (ref 3.5–5)
ALP SERPL-CCNC: 103 U/L (ref 46–116)
ALT SERPL W P-5'-P-CCNC: 89 U/L (ref 12–78)
ANION GAP SERPL CALCULATED.3IONS-SCNC: 7 MMOL/L (ref 4–13)
AST SERPL W P-5'-P-CCNC: 21 U/L (ref 5–45)
BILIRUB SERPL-MCNC: 0.45 MG/DL (ref 0.2–1)
BUN SERPL-MCNC: 15 MG/DL (ref 5–25)
CALCIUM SERPL-MCNC: 9.4 MG/DL (ref 8.3–10.1)
CHLORIDE SERPL-SCNC: 108 MMOL/L (ref 100–108)
CO2 SERPL-SCNC: 22 MMOL/L (ref 21–32)
CREAT SERPL-MCNC: 0.67 MG/DL (ref 0.6–1.3)
GFR SERPL CREATININE-BSD FRML MDRD: 126 ML/MIN/1.73SQ M
GLUCOSE P FAST SERPL-MCNC: 89 MG/DL (ref 65–99)
POTASSIUM SERPL-SCNC: 4 MMOL/L (ref 3.5–5.3)
PROT SERPL-MCNC: 7.1 G/DL (ref 6.4–8.2)
RYE IGE QN: NEGATIVE
SODIUM SERPL-SCNC: 137 MMOL/L (ref 136–145)

## 2022-06-06 PROCEDURE — 36415 COLL VENOUS BLD VENIPUNCTURE: CPT

## 2022-06-06 PROCEDURE — 80053 COMPREHEN METABOLIC PANEL: CPT

## 2022-06-06 NOTE — UTILIZATION REVIEW
Initial Clinical Review  OBSERVATION  6/1/22 @ 2102 CONVERTED TO INPATIENT ADMISSION 6/3/22 @0649 DUE TO CONTINUED STAY REQUIRED TO EVALUATE AND TREAT PATIENT WITH ELEVATED LFT's WITH CONTINUED WORKUP  GI FOLLOWING   Admission: Date/Time/Statement:   Admission Orders (From admission, onward)     Ordered        06/03/22 0649  Inpatient Admission  Once            06/01/22 2102  Place in Observation  Once                      Orders Placed This Encounter   Procedures    Inpatient Admission     Standing Status:   Standing     Number of Occurrences:   1     Order Specific Question:   Level of Care     Answer:   Med Surg [16]     Order Specific Question:   Estimated length of stay     Answer:   More than 2 Midnights     Order Specific Question:   Certification     Answer:   I certify that inpatient services are medically necessary for this patient for a duration of greater than two midnights  See H&P and MD Progress Notes for additional information about the patient's course of treatment  ED Arrival Information     Expected   6/1/2022     Arrival   6/1/2022 11:03    Acuity   Urgent            Means of arrival   Ambulance    Escorted by   [facility]    Service   Hospitalist    Admission type   Urgent            Arrival complaint   Right Upper Quad pain           Chief Complaint   Patient presents with    Abdominal Pain     [facility], sent here for further work up       Initial Presentation: 22 y o  female with hx anxiety presents to ED from Urgent care with RUQ pan radiating to epigastric and left upper quadrant and nausea, associated fever, chills  Pain worse with deep breathing Pt has diarrhea 2 days ago that is now resolved   Pt recently traveled to   On exam, generalized abdom tenderness , abdomen soft  Labs-elevated liver enzymes, leukocytosis  13 5, elevated D-dimer 1 75    CTA/P shows borderline splenomegaly Abdominal ultrasound shows minimal gallbladder wall thickening without gallstones or dilatation of gallbladder  Findings not suggestive of acute cholecystitis  Pt given IVF, IV analgesics in ED  Pt admitted as OBS with abdominal pain, transaminitis-hepatocellular pattern, elevated D dimer  Plan - Check hepatitis panel, pain control, trend LFT's, non ulcerogenic diet, IVF, monitor WBC's, Obtain CTA chest Consult general surgery  General surgery- epigastric and right upper quadrant pain that began this morning  Right upper quadrant US does not suggest acute cholecystitis  Pain is unlikely biliary in origin  Recommend- medical workup of elevated LFT's, no surgical intervention at this time needed   Date 6/2  Medicine-Temp 100 6 F this am  WBC normalized today   Hepatitis panel neg  LFT's increased today from admission   Continues with RUQ abdominal pain and R flank,improved from admission  CTA chest neg for PE - elevated D dimer on admission  No leg swelling, tenderness or erythema   Pt started on PPI empirically for gastritis/PUD by GI   Autoimmune serologies ordered along w/ celiac disease profile, CMV, EBV  Continue to trend LFT's     GI consult-pt reports hx celiac disease that was never confirmed  Plan- start PPI , check iron panel, autoimmune serologies including SINDY, AMA, ASMA, and celiac disease profile  Monitor LFT's  If transaminases continue to increase and serology workup unremarkable, consider liver bx   Consider MRCP if LFT's increase in obstructive pattern  Consider EGD if RUQ pain worsensdespite empiric tx for PUD   Date 6/3 converted to IP  GI-Elevated liver tests mainly hepatocellular injury  Persistent right upper quadrant discomfort today  Occasional nausea  Has not eaten this morning  LFTs improving with AST now 51 and ALT of 135  Alkaline phosphatase has resolved  Total bilirubin remains normal  Pt may have passed a stone, gastritis and PUD also in diff of symptoms however would not cause elevated liver tests   Serology workup pending  Hold off on EGD today, appears more biliary etiology  Continue PPI  daily, consider adding carafate  Antiemetic as needed  Advance to non ulcerogenic diet as nasir, continue to trend LFT's   If persist or worsen , recommend MRI /MRCP   ED Triage Vitals [06/01/22 1115]   Temperature Pulse Respirations Blood Pressure SpO2   97 9 °F (36 6 °C) 80 18 125/64 97 %      Temp Source Heart Rate Source Patient Position - Orthostatic VS BP Location FiO2 (%)   Oral Monitor Lying Left arm --      Pain Score       10 - Worst Possible Pain          Wt Readings from Last 1 Encounters:   06/01/22 70 kg (154 lb 5 2 oz)     Additional Vital Signs:   Date/Time Temp Pulse Resp BP MAP (mmHg) SpO2   06/03/22 08:19:54 97 9 °F (36 6 °C) 65 16 110/71 84 99 %   06/02/22 21:58:48 98 3 °F (36 8 °C) 76 -- 120/77 91 96 %   06/02/22 21:58:25 98 3 °F (36 8 °C) 75 -- 120/77 91 96 %   06/02/22 14:48:18 98 7 °F (37 1 °C) 74 18 114/79 91 96 %       Date/Time Temp Pulse Resp BP MAP (mmHg) SpO2   06/02/22 07:31:37 100 6 °F (38 1 °C) Abnormal  96 16 114/78 90 97 %       Date/Time Temp Pulse Resp BP MAP (mmHg) SpO2   06/02/22 00:10:52 97 9 °F (36 6 °C) 84 16 94/55 68 97 %   06/01/22 2232 98 6 °F (37 °C) 78 16 117/72 89 97 %   06/01/22 1428 -- 89 17 124/78 -- 99 %       Pertinent Labs/Diagnostic Test Results:   CTA chest pe study   Final Result by Annamarie Bermudez MD (06/02 0134)      No evidence of pulmonary embolus  No evidence of acute intrathoracic pathology  Workstation performed: [id number]         CT abdomen pelvis wo contrast   Final Result by Javier Armendariz MD (06/01 6385)      No acute inflammatory process identified  Borderline splenomegaly  Workstation performed: [id number]         US right upper quadrant   Final Result by Bridget Basurto MD (06/01 1344)      Minimal gallbladder wall thickening without gallstones or dilatation of the gallbladder    Finding most likely is related to underlying liver disease and is not suggestive of acute cholecystitis        Workstation performed: [id number]           Results from last 7 days   Lab Units 06/01/22  2250   SARS-COV-2  Negative     Results from last 7 days   Lab Units 06/03/22  0838 06/02/22  0454 06/01/22  1148   WBC Thousand/uL 4 69 4 62 13 50*   HEMOGLOBIN g/dL 12 6 13 1 14 5   HEMATOCRIT % 37 8 38 8 43 7   PLATELETS Thousands/uL 178 193 212   NEUTROS ABS Thousands/µL 2 18  --  11 10*         Results from last 7 days   Lab Units 06/03/22  0838 06/02/22  0454 06/01/22  1148   SODIUM mmol/L 139 139 136   POTASSIUM mmol/L 3 8 3 9 4 9   CHLORIDE mmol/L 109* 109* 103   CO2 mmol/L 23 24 26   ANION GAP mmol/L 7 6 7   BUN mg/dL 11 9 10   CREATININE mg/dL 0 68 0 65 0 72   EGFR ml/min/1 73sq m 126 128 120   CALCIUM mg/dL 8 7 8 4 9 1     Results from last 7 days   Lab Units 06/03/22  0838 06/02/22  0454 06/01/22  1148   AST U/L 51* 161* 136*   ALT U/L 135* 220* 65*   ALK PHOS U/L 97 110* 101   TOTAL PROTEIN g/dL 6 4 6 2* 7 4   ALBUMIN g/dL 3 8 3 6 4 4   TOTAL BILIRUBIN mg/dL 0 52 0 57 1 00         Results from last 7 days   Lab Units 06/03/22  0838 06/02/22  0454 06/01/22  1148   GLUCOSE RANDOM mg/dL 83 95 89               Results from last 7 days   Lab Units 06/01/22  2047   D-DIMER QUANTITATIVE ug/ml FEU 1 75*     Results from last 7 days   Lab Units 06/03/22  0744   PROTIME seconds 13 0   INR  0 98       Results from last 7 days   Lab Units 06/03/22  0744   FERRITIN ng/mL 23     Results from last 7 days   Lab Units 06/02/22  0454   HEP B S AG  Non-reactive   HEP C AB  Non-reactive   HEP B C IGM  Non-reactive     Results from last 7 days   Lab Units 06/01/22  1148   LIPASE u/L 26                 Results from last 7 days   Lab Units 06/01/22  1150   CLARITY UA  Clear   COLOR UA  Yellow   SPEC GRAV UA  1 020   PH UA  5 5   GLUCOSE UA mg/dl Negative   KETONES UA mg/dl Negative   BLOOD UA  Negative   PROTEIN UA mg/dl Negative   NITRITE UA  Negative   BILIRUBIN UA Negative   UROBILINOGEN UA E U /dl 0 2   LEUKOCYTES UA  Small*   WBC UA /hpf 2-4   RBC UA /hpf 0-1   BACTERIA UA /hpf Occasional   EPITHELIAL CELLS WET PREP /hpf Occasional     Results from last 7 days   Lab Units 06/01/22  2250   INFLUENZA A PCR  Negative   INFLUENZA B PCR  Negative   RSV PCR  Negative                 ED Treatment:   Medication Administration from 06/01/2022 1026 to 06/01/2022 2253       Date/Time Order Dose Route Action     06/01/2022 1229 ketorolac (TORADOL) injection 15 mg 15 mg Intravenous Given     06/01/2022 1230 sodium chloride 0 9 % bolus 1,000 mL 1,000 mL Intravenous New Bag     06/01/2022 1412 diphenhydrAMINE (BENADRYL) tablet 25 mg 25 mg Oral Given     06/01/2022 1424 morphine (PF) 4 mg/mL injection 4 mg 4 mg Intravenous Given        History reviewed  No pertinent past medical history  Present on Admission:  **None**      Admitting Diagnosis: Abdominal pain [R10 9]  RUQ pain [R10 11]  Upper abdominal pain [R10 10]  Age/Sex: 22 y o  female  Admission Orders:  Scheduled Medications:  PARoxetine, 30 mg, Oral, QAM  pantoprazole (PROTONIX) EC tablet 40 mg  Dose: 40 mg  Freq: Daily (early morning) Route: PO  Start: 06/02/22 1045    Continuous IV Infusions:  multi-electrolyte, 75 mL/hr, Intravenous, ContinuousEnd: 06/02/22 1010      PRN Meds:  HYDROmorphone, 0 2 mg, Intravenous, TID PRN x1 6/2, x1 6/3  ondansetron, 4 mg, Intravenous, Q6H PRN  simethicone, 80 mg, Oral, 4x Daily PRN    GI, non ulcerogenic diet   ambulate QID      IP CONSULT TO ACUTE CARE SURGERY  IP CONSULT TO GASTROENTEROLOGY    Network Utilization Review Department  ATTENTION: Please call with any questions or concerns to [phone] and carefully listen to the prompts so that you are directed to the right person   All voicemails are confidential   Celia Marcial all requests for admission clinical reviews, approved or denied determinations and any other requests to dedicated fax number below belonging to the campus where the patient is receiving treatment   List of dedicated fax numbers for the Facilities:  [address] [place] [address] DENIALS (Administrative/Medical Necessity) [phone]   [address]  [address] (Maternity/NICU/Pediatrics) [phone]   [address] [place] [address]  [address] [address] [place] [address] [address] [facility] [phone]

## 2022-06-09 ENCOUNTER — HOSPITAL ENCOUNTER (EMERGENCY)
Facility: HOSPITAL | Age: 22
Discharge: HOME | End: 2022-06-09
Attending: EMERGENCY MEDICINE
Payer: COMMERCIAL

## 2022-06-09 VITALS
OXYGEN SATURATION: 99 % | RESPIRATION RATE: 16 BRPM | BODY MASS INDEX: 23.46 KG/M2 | SYSTOLIC BLOOD PRESSURE: 122 MMHG | DIASTOLIC BLOOD PRESSURE: 72 MMHG | HEART RATE: 88 BPM | TEMPERATURE: 97.7 F | WEIGHT: 154.32 LBS

## 2022-06-09 DIAGNOSIS — R10.11 RIGHT UPPER QUADRANT ABDOMINAL PAIN: Primary | ICD-10-CM

## 2022-06-09 PROCEDURE — 99283 EMERGENCY DEPT VISIT LOW MDM: CPT

## 2022-06-09 PROCEDURE — 99282 EMERGENCY DEPT VISIT SF MDM: CPT | Performed by: EMERGENCY MEDICINE

## 2022-06-09 NOTE — DISCHARGE INSTRUCTIONS
Follow-up with your PCP  Follow-up with GI as scheduled  Return to ED if symptoms recur, worsen, and/or fails to improve  Abdominal Pain   WHAT YOU NEED TO KNOW:   What do I need to know about abdominal pain? Abdominal pain may be felt between the bottom of your rib cage and your groin  Acute pain usually lasts less than 3 months  Chronic pain lasts longer than 3 months  Your pain may be sharp or dull  The pain may stay in the same place or move around  You may have the pain all the time, or it may come and go  Depending on the cause, you may also have nausea, vomiting, fever, or diarrhea  What causes abdominal pain? The cause may not be found  The following are common causes of abdominal pain:  Overeating, gas pains, or food poisoning    Constipation or diarrhea    An injury    A serious health problem, such as appendicitis, a hernia, or an ulcer    Infection or a blockage    A liver, gallbladder, or kidney condition    How is the cause of abdominal pain diagnosed? Your healthcare provider will check your abdomen  He or she will ask where your pain is and when it started  Tell him or her if the pain wakes you or stops you from doing your daily activities  Describe anything that makes the pain better or worse  You may also need any of the following:  Blood, urine, or bowel movement  samples may be tested for signs of an infection, disease, or injury  X-ray pictures  of your abdomen may show an injury or other cause of the pain  How is abdominal pain treated? Prescription pain medicine  may be given  Ask your healthcare provider how to take this medicine safely  Some prescription pain medicines contain acetaminophen  Do not take other medicines that contain acetaminophen without talking to your healthcare provider  Too much acetaminophen may cause liver damage  Prescription pain medicine may cause constipation  Ask your healthcare provider how to prevent or treat constipation       Medicines  may be given to calm your stomach or prevent vomiting  Relaxation therapy  may be used along with pain medicine  Surgery  may be needed, depending on the cause  What can I do to manage my symptoms? Apply heat  on your abdomen for 20 to 30 minutes every 2 hours for as many days as directed  Heat helps decrease pain and muscle spasms  Make changes to the food you eat, if needed  Do not eat foods that cause abdominal pain or other symptoms  Eat small meals more often  The following changes may also help:    Eat more high-fiber foods if you are constipated  High-fiber foods include fruits, vegetables, whole-grain foods, and legumes  Do not eat foods that cause gas if you have bloating  Examples include broccoli, cabbage, and cauliflower  Do not drink soda or carbonated drinks  These may also cause gas  Do not eat foods or drinks that contain sorbitol or fructose if you have diarrhea and bloating  Some examples are fruit juices, candy, jelly, and sugar-free gum  Do not eat high-fat foods  Examples include fried foods, cheeseburgers, hot dogs, and desserts  Limit or do not have caffeine  Caffeine may make symptoms such as heartburn or nausea worse  Drink more liquids to prevent dehydration from diarrhea or vomiting  Ask your healthcare provider how much liquid to drink each day and which liquids are best for you  Keep a diary of your abdominal pain  A diary may help your healthcare provider learn what is causing your abdominal pain  Include when the pain happens, how long it lasts, and what the pain feels like  Write down any other symptoms you have with abdominal pain  Also write down what you eat, and what symptoms you have after you eat  Manage your stress  Stress may cause abdominal pain  Your healthcare provider may recommend relaxation techniques and deep breathing exercises to help decrease your stress   Your healthcare provider may recommend you talk to someone about your stress or anxiety, such as a counselor or a trusted friend  Get plenty of sleep and exercise regularly  Limit or do not drink alcohol  Alcohol can make your abdominal pain worse  Ask your healthcare provider if it is safe for you to drink alcohol  Also ask how much is safe for you to drink  Do not smoke  Nicotine and other chemicals in cigarettes can damage your esophagus and stomach  Ask your healthcare provider for information if you currently smoke and need help to quit  E-cigarettes or smokeless tobacco still contain nicotine  Talk to your healthcare provider before you use these products  Call your local emergency number (911 in the [address]) if:   You have new chest pain or shortness of breath  When should I seek immediate care? You have pulsing pain in your upper abdomen or lower back that suddenly becomes constant  Your pain is in the right lower abdominal area and worsens with movement  You have a fever over 100 4°F (38°C) or shaking chills  You are vomiting and cannot keep food or liquids down  Your pain does not improve or gets worse over the next 8 to 12 hours  You see blood in your vomit or bowel movements, or they look black and tarry  Your skin or the whites of your eyes turn yellow  You are a woman and have a large amount of vaginal bleeding that is not your monthly period  When should I call my doctor? You have pain in your lower back  You are a man and have pain in your testicles  You have pain when you urinate  You have questions or concerns about your condition or care  CARE AGREEMENT:   You have the right to help plan your care  Learn about your health condition and how it may be treated  Discuss treatment options with your healthcare providers to decide what care you want to receive  You always have the right to refuse treatment  The above information is an  only   It is not intended as medical advice for individual conditions or treatments  Talk to your doctor, nurse or pharmacist before following any medical regimen to see if it is safe and effective for you  © Copyright [employer] [facility] 2022 Information is for End User's use only and may not be sold, redistributed or otherwise used for commercial purposes   All illustrations and images included in CareNotes® are the copyrighted property of A D A M , Inc  or [address] [place] [address]

## 2022-06-09 NOTE — ED PROVIDER NOTES
History  Chief Complaint   Patient presents with    Abdominal Pain     Discharged Friday after being admitted with same complaint     Daileyemeka Garcia is a 22 y o  female who presents with c/o sharp, pulsating epigastric and RUQ abdominal pain that began 6:45 am this morning  Intensity of pain is currently 6/10 though was "12/10" this morning  Pain originally was radiating to right "upper back" but now she denies radiation of pain  She felt some RUQ discomfort yesterday evening while eating pizza  No alleviating factors  Had nausea this morning which has since resolved  She reports history of constipation and uses Dulcolax as needed and recalls taking Dulcolax approximately 2 days ago  Prior to her last hospitalization for similar complaint she had also taken Dulcolax 2-3 days prior  During her last hospitalization workup had revealed transaminitis which had improved with PPI and she was discharged home with outpatient follow-up with GI and she has appointment scheduled for 6/16  Prior to Admission Medications   Prescriptions Last Dose Informant Patient Reported? Taking? PARoxetine (PAXIL) 30 mg tablet 6/8/2022 at Unknown time  Yes Yes   Sig: Take 30 mg by mouth every morning   ondansetron (ZOFRAN) 4 mg tablet Past Week at Unknown time  No Yes   Sig: Take 1 tablet (4 mg total) by mouth every 8 (eight) hours as needed for nausea or vomiting   pantoprazole (PROTONIX) 40 mg tablet 6/8/2022 at Unknown time  No Yes   Sig: Take 1 tablet (40 mg total) by mouth daily in the early morning      Facility-Administered Medications: None       History reviewed  No pertinent past medical history  History reviewed  No pertinent surgical history  History reviewed  No pertinent family history  I have reviewed and agree with the history as documented      E-Cigarette/Vaping     E-Cigarette/Vaping Substances    Nicotine No     THC No     CBD No     Flavoring No     Other No     Unknown No      Social History Tobacco Use    Smoking status: Never Smoker    Smokeless tobacco: Never Used   Substance Use Topics    Alcohol use: Not Currently    Drug use: Not Currently        Review of Systems   Constitutional: Negative for appetite change, chills, diaphoresis, fever and unexpected weight change  Gastrointestinal: Positive for abdominal pain  Negative for abdominal distention, blood in stool, constipation, diarrhea, nausea and vomiting  Genitourinary: Negative for decreased urine volume, dysuria, hematuria, menstrual problem and pelvic pain  Skin: Negative for color change and rash  ROS is negative except for as above  Physical Exam  ED Triage Vitals [06/09/22 0743]   Temperature Pulse Respirations Blood Pressure SpO2   97 7 °F (36 5 °C) 85 16 122/72 99 %      Temp Source Heart Rate Source Patient Position - Orthostatic VS BP Location FiO2 (%)   Oral Monitor Sitting Right arm --      Pain Score       7             Orthostatic Vital Signs  Vitals:    06/09/22 0743 06/09/22 0745   BP: 122/72 122/72   Pulse: 85 88   Patient Position - Orthostatic VS: Sitting        Physical Exam  Vitals reviewed  Constitutional:       General: She is not in acute distress  Appearance: She is not toxic-appearing  HENT:      Mouth/Throat:      Mouth: Mucous membranes are moist    Eyes:      Extraocular Movements: Extraocular movements intact  Pupils: Pupils are equal, round, and reactive to light  Cardiovascular:      Rate and Rhythm: Normal rate and regular rhythm  Pulses: Normal pulses  Heart sounds: Normal heart sounds  Pulmonary:      Effort: Pulmonary effort is normal       Breath sounds: Normal breath sounds  Abdominal:      General: Bowel sounds are normal  There is no distension  Palpations: Abdomen is soft  Tenderness: There is abdominal tenderness (minimal) in the right upper quadrant  There is no right CVA tenderness, left CVA tenderness, guarding or rebound   Negative signs include Floyd's sign, Rovsing's sign and McBurney's sign  Hernia: No hernia is present  Musculoskeletal:      Right lower leg: No edema  Left lower leg: No edema  Skin:     Capillary Refill: Capillary refill takes less than 2 seconds  Findings: No rash  Neurological:      Mental Status: She is alert  ED Medications  Medications - No data to display    Diagnostic Studies  Results Reviewed     None                 No orders to display         Procedures  Procedures      ED Course  ED Course as of 06/09/22 0850   Thu Jun 09, 2022   0825 Patient reports spontaneous resolution of pain at this time  She states she has a "finger surgery" today at 10:45 that has been scheduled for a while and she would like to go  We discussed that workup has not been done and that we do not know the source of her pain nor that it whether or not it will recur  She verbalized understanding of risks of leaving but would still like to be DC home as she has appointment with GI on 6/16 and she feels comfortable following-up with them  I instructed her to f/u with her PCP as soon as possible  ED return precautions emphasized  Pt verbalized understanding  MDM  Number of Diagnoses or Management Options  Diagnosis management comments: Patient requesting DC home as abdominal pain as spontaneously resolved and refused workup including blood work and imaging completed at this time  Pt verbalized understanding of risks of leaving prior to complete workup and appropriate mgmt  ED return precautions advised  Instructed to f/u with PCP as soon as possible  F/u with GI as scheduled (appointment previously scheduled for 6/16)  Disposition  Final diagnoses:   None     ED Disposition     None      Follow-up Information    None         Patient's Medications   Discharge Prescriptions    No medications on file     No discharge procedures on file      PDMP Review     None           ED Provider  Attending physically available and evaluated Karon Vera  IKER managed the patient along with the ED Attending      Electronically Signed by         Gail Bal MD  06/09/22 1982

## 2022-06-14 NOTE — ED ATTENDING ATTESTATION
6/9/2022  IThea MD, saw and evaluated the patient  I have discussed the patient with the resident/non-physician practitioner and agree with the resident's/non-physician practitioner's findings, Plan of Care, and MDM as documented in the resident's/non-physician practitioner's note, except where noted  All available labs and Radiology studies were reviewed  I was present for key portions of any procedure(s) performed by the resident/non-physician practitioner and I was immediately available to provide assistance  At this point I agree with the current assessment done in the Emergency Department    I have conducted an independent evaluation of this patient a history and physical is as follows:see h and p above     ED Course         Critical Care Time  Procedures

## 2022-06-16 ENCOUNTER — TELEPHONE (OUTPATIENT)
Dept: GASTROENTEROLOGY | Facility: CLINIC | Age: 22
End: 2022-06-16

## 2022-06-16 ENCOUNTER — CONSULT (OUTPATIENT)
Dept: GASTROENTEROLOGY | Facility: CLINIC | Age: 22
End: 2022-06-16

## 2022-06-16 ENCOUNTER — APPOINTMENT (OUTPATIENT)
Dept: LAB | Facility: CLINIC | Age: 22
End: 2022-06-16
Payer: COMMERCIAL

## 2022-06-16 VITALS
BODY MASS INDEX: 25.1 KG/M2 | SYSTOLIC BLOOD PRESSURE: 107 MMHG | WEIGHT: 165.6 LBS | HEIGHT: 68 IN | DIASTOLIC BLOOD PRESSURE: 76 MMHG | HEART RATE: 73 BPM

## 2022-06-16 DIAGNOSIS — Z86.19 HX OF HELICOBACTER INFECTION: ICD-10-CM

## 2022-06-16 DIAGNOSIS — R74.01 TRANSAMINITIS: ICD-10-CM

## 2022-06-16 DIAGNOSIS — R10.13 EPIGASTRIC PAIN: Primary | ICD-10-CM

## 2022-06-16 LAB
ALBUMIN SERPL BCP-MCNC: 4.3 G/DL (ref 3.5–5)
ALP SERPL-CCNC: 95 U/L (ref 34–104)
ALT SERPL W P-5'-P-CCNC: 37 U/L (ref 7–52)
AST SERPL W P-5'-P-CCNC: 20 U/L (ref 13–39)
BILIRUB DIRECT SERPL-MCNC: 0.07 MG/DL (ref 0–0.2)
BILIRUB SERPL-MCNC: 0.45 MG/DL (ref 0.2–1)
PROT SERPL-MCNC: 6.8 G/DL (ref 6.4–8.4)

## 2022-06-16 PROCEDURE — 36415 COLL VENOUS BLD VENIPUNCTURE: CPT

## 2022-06-16 PROCEDURE — 80076 HEPATIC FUNCTION PANEL: CPT

## 2022-06-16 RX ORDER — ALPRAZOLAM 0.5 MG/1
TABLET ORAL
COMMUNITY
Start: 2022-04-20

## 2022-06-16 NOTE — PROGRESS NOTES
[facility] - Outpatient Follow-up Note  Lincoln Huerta 22 y o  female MRN: [id number]  Encounter: [id number]          ASSESSMENT AND PLAN:      1  Transaminitis  Patient with epigastric and right upper quadrant pain and elevated LFTs with some minimal gallbladder wall thickening visualized on ultrasound  No gallstones were noted on imaging and patient may have passed gravel or small stone that was not visualized on ultrasound  Will repeat LFTs at this time to assure normalization from previous since she still had an elevated ALT and also gave her script for LFTs to get if she does have the abdominal pain  - Hepatic function panel; Future  - Hepatic function panel; Future    2  Epigastric pain  Would recommend MRI MRCP for further evaluation of any biliary abnormality, choledocholithiasis  Etiology seems biliary in origin especially with elevated LFTs  Suspect either passing stone or gravel versus biliary dyskinesia versus sphincter of Oddi spasm  If MRI as well as EGD are negative for any pertinent findings, would recommend to have HIDA with CCK  Can continue PPI  - MRI abdomen w wo contrast and mrcp; Future  - EGD; Future    3  Hx of Helicobacter infection  Patient with upcoming EGD scheduled in the near future as she is at risk of peptic ulcer disease due to history of H pylori and we can biopsy at that time for H pylori  Can continue PPI as above     ______________________________________________________________________    SUBJECTIVE:    This is a 28-year-old who presents for follow-up to hospitalization  Patient was having epigastric pain and right upper quadrant pain and noted to have elevated LFT  Full workup was performed for any underlying cause of liver disease  Patient otherwise had episode of abdominal pain again on the 9th of this month and she went to ER    Pain was again severe starting in epigastric area as a fullness and then radiating to the right upper quadrant and upper back and describes the pain as the worst pain of her life  Unfortunately she did not have blood work at that time because she had to leave because she was going to get surgery on her wrist   Ultrasound on 06/01/2022 had shown minimal gallbladder wall thickening without gallstones or dilatation of the gallbladder and findings most likely related to underlying liver disease and not suggestive of acute cholecystitis  CT scan of abdomen and pelvis then had shown no acute inflammatory process identified and borderline splenomegaly  Of note CMV and EBV were negative, but did indicate prior infection  She also had a CTA of her chest which had shown no evidence of pulmonary embolism  Patient otherwise reports that she had an EGD at [facility] for evaluation of celiac disease and apparently her blood work was borderline but duodenal biopsies reportedly were negative  Does have history of H pylori and completed therapy for that but denies ever having stool study confirmed eradication  She otherwise states that she did have some nausea associated with the pain  Patient was given pantoprazole  Denies any history of peptic ulcer disease and was not taking a PPI until after her last hospitalization  Denies any frequent NSAID use  LFTs on 06/06/2022 had significantly improved and ALT was 89 but otherwise normal   On 06/02/2022 her AST was 161 and her ALT was 220 and alk-phos was 110  REVIEW OF SYSTEMS IS OTHERWISE NEGATIVE  Historical Information   History reviewed  No pertinent past medical history  History reviewed  No pertinent surgical history  Social History   Social History     Substance and Sexual Activity   Alcohol Use Not Currently     Social History     Substance and Sexual Activity   Drug Use Not Currently     Social History     Tobacco Use   Smoking Status Never Smoker   Smokeless Tobacco Never Used     History reviewed  No pertinent family history      Meds/Allergies Current Outpatient Medications:     pantoprazole (PROTONIX) 40 mg tablet    PARoxetine (PAXIL) 30 mg tablet    ALPRAZolam (XANAX) 0 5 mg tablet    ondansetron (ZOFRAN) 4 mg tablet    Allergies   Allergen Reactions    Amoxicillin GI Intolerance           Objective     Blood pressure 107/76, pulse 73, height 5' 8" (1 727 m), weight 75 1 kg (165 lb 9 6 oz), last menstrual period 05/18/2022  Body mass index is 25 18 kg/m²  PHYSICAL EXAM:      General Appearance:   Alert, cooperative, no distress   HEENT:   Normocephalic, atraumatic, anicteric      Neck:  Supple, symmetrical, trachea midline   Lungs:   Clear to auscultation bilaterally; no rales, rhonchi or wheezing; respirations unlabored    Heart[de-identified]   Regular rate and rhythm; no murmur, rub, or gallop  Abdomen:   Soft, non-tender, non-distended; normal bowel sounds; no masses, no organomegaly    Genitalia:   Deferred    Rectal:   Deferred    Extremities:  No cyanosis, clubbing or edema    Pulses:  2+ and symmetric    Skin:  No jaundice, rashes, or lesions    Lymph nodes:  No palpable cervical lymphadenopathy        Lab Results:   No visits with results within 1 Day(s) from this visit  Latest known visit with results is:   Appointment on 06/06/2022   Component Date Value    Sodium 06/06/2022 137     Potassium 06/06/2022 4 0     Chloride 06/06/2022 108     CO2 06/06/2022 22     ANION GAP 06/06/2022 7     BUN 06/06/2022 15     Creatinine 06/06/2022 0 67     Glucose, Fasting 06/06/2022 89     Calcium 06/06/2022 9 4     AST 06/06/2022 21     ALT 06/06/2022 89 (A)    Alkaline Phosphatase 06/06/2022 103     Total Protein 06/06/2022 7 1     Albumin 06/06/2022 3 8     Total Bilirubin 06/06/2022 0 45     eGFR 06/06/2022 126          Radiology Results:   CT abdomen pelvis wo contrast    Result Date: 6/1/2022  Narrative: CT ABDOMEN AND PELVIS WITHOUT IV CONTRAST INDICATION:   Abdominal pain, acute, nonlocalized RUQ pain   COMPARISON:  Right upper quadrant ultrasound from earlier today  TECHNIQUE:  CT examination of the abdomen and pelvis was performed without intravenous contrast  This examination was performed without intravenous contrast in the context of the critical nationwide Omnipaque shortage  Axial, sagittal, and coronal 2D reformatted images were created from the source data and submitted for interpretation  Radiation dose length product (DLP) for this visit:  349 mGy-cm   This examination, like all CT scans performed in the [facility], was performed utilizing techniques to minimize radiation dose exposure, including the use of iterative reconstruction and automated exposure control  Enteric contrast was not administered  FINDINGS: ABDOMEN LOWER CHEST:  No clinically significant abnormality identified in the visualized lower chest  LIVER/BILIARY TREE:  Unremarkable  GALLBLADDER:  No calcified gallstones  No pericholecystic inflammatory change  SPLEEN:  Borderline splenomegaly measuring 13 3 cm  PANCREAS:  Unremarkable  ADRENAL GLANDS:  Unremarkable  KIDNEYS/URETERS:  Unremarkable  No hydronephrosis  STOMACH AND BOWEL:  Unremarkable  APPENDIX:  No findings to suggest appendicitis  ABDOMINOPELVIC CAVITY:  No ascites  No pneumoperitoneum  No lymphadenopathy  VESSELS:  Unremarkable for patient's age  PELVIS REPRODUCTIVE ORGANS:  IUD in satisfactory position  No adnexal mass  URINARY BLADDER:  Unremarkable  ABDOMINAL WALL/INGUINAL REGIONS:  Unremarkable  OSSEOUS STRUCTURES:  No acute fracture or destructive osseous lesion  Impression: No acute inflammatory process identified  Borderline splenomegaly  Workstation performed: [id number]     CTA chest pe study    Result Date: 6/2/2022  Narrative: CTA - CHEST WITH IV CONTRAST - PULMONARY ANGIOGRAM INDICATION:   Pulmonary embolism (PE) suspected, positive D-dimer elevated d-dimer, covid 19 negative    COMPARISON: None   TECHNIQUE: CTA examination of the chest was performed using angiographic technique according to a protocol specifically tailored to evaluate for pulmonary embolism  Axial, sagittal, and coronal 2D reformatted images were created from the source data and  submitted for interpretation  In addition, coronal 3D MIP postprocessing was performed on the acquisition scanner  Radiation dose length product (DLP) for this visit:  408 mGy-cm   This examination, like all CT scans performed in the [facility], was performed utilizing techniques to minimize radiation dose exposure, including the use of iterative reconstruction and automated exposure control  IV Contrast:  70 mL of iodixanol (VISIPAQUE)  FINDINGS: PULMONARY ARTERIAL TREE:  No pulmonary embolus is seen  LUNGS:  Lungs are clear  There is no tracheal or endobronchial lesion  PLEURA:  Unremarkable  HEART/GREAT VESSELS:  Unremarkable for patient's age  No thoracic aortic aneurysm  MEDIASTINUM AND JONEL:  Unremarkable  CHEST WALL AND LOWER NECK:   Unremarkable  VISUALIZED STRUCTURES IN THE UPPER ABDOMEN:  Unremarkable  OSSEOUS STRUCTURES:  No acute fracture or destructive osseous lesion  Impression: No evidence of pulmonary embolus  No evidence of acute intrathoracic pathology  Workstation performed: [id number]     US right upper quadrant    Result Date: 6/1/2022  Narrative: RIGHT UPPER QUADRANT ULTRASOUND INDICATION:     RUQ pain  Abnormal liver function tests COMPARISON:  None TECHNIQUE:   Real-time ultrasound of the right upper quadrant was performed with a curvilinear transducer with both volumetric sweeps and still imaging techniques  FINDINGS: PANCREAS:  Visualized portions of the pancreas are within normal limits  Pancreatic tail is obscured by bowel gas  AORTA AND IVC:  Visualized portions are normal for patient age  LIVER: Size:  Within normal range  The liver measures 14 2 cm in the midclavicular line  Contour:  Surface contour is smooth   Parenchyma:  Echogenicity and echotexture are within normal limits  No liver mass identified  Limited imaging of the main portal vein shows it to be patent and hepatopetal   BILIARY: No gallstones are seen  There is mild prominence of the biliary wall measuring 4 mm  No pericholecystic fluid is seen  Leonor Capri sign is negative  No intrahepatic biliary dilatation  CBD measures 4 0 mm  No choledocholithiasis  KIDNEY: Right kidney measures 10 1 x 4 1 x 5 0 cm  Volume 109 4 mL Kidney within normal limits  ASCITES:   None  Impression: Minimal gallbladder wall thickening without gallstones or dilatation of the gallbladder  Finding most likely is related to underlying liver disease and is not suggestive of acute cholecystitis  Workstation performed: [facility] bedside procedure    Result Date: 6/1/2022  Narrative: 1 [id number]  2 [id number] 1

## 2022-06-16 NOTE — TELEPHONE ENCOUNTER
Travis Ott 28 Assessment    Name: Charlestine Lesch  YOB: 2000  Last Height: 5' 8" (1 727 m)  Last weight: 75 1 kg (165 lb 9 6 oz)  BMI: 25 18 kg/m²  Procedure: Egd  Diagnosis: see order  Date of procedure: 6/24/22  Prep:   Responsible : yes  Phone#: [phone]  Name completing form: Sintia Pastor  Date form completed: 06/16/22      If the patient answers yes to any of these questions, schedule in a hospital  Are you pregnant: No  Do you rely on a wheelchair for mobility: No  Have you been diagnosed with End Stage Renal Disease (ESRD): No  Do you need oxygen during the day: No  Have you had a heart attack or stroke within the past three months: No  Have you had a seizure within the past three months: No  Have you ever been informed by anesthesia that you have a difficult airway: No  Additional Questions  Have you had any cardiac testing or are under the care of a Cardiologist (see cardiac list): No  Cardiac list:   Do you have an implanted cardiac defibrillator: No (Comment:  This patient should be scheduled in the hospital)    Have any bleeding problems, such as anemia or hemophilia (If patient has H&H result below 8, schedule in hospital   H&H must be within 30 days of procedure): No    Had an organ transplant within the past 3 months: No    Do you have any present infections: No  Do you get short of breath when walking a few blocks: No  Have you been diagnosed with diabetes: No  Comments (provide cardiac provider information if applicable):

## 2022-06-16 NOTE — TELEPHONE ENCOUNTER
Scheduled date of EGD(as of today): 6/24/22  Physician performing EGD: Dr Jennifer Pineda  Location of EGD: [facility]  Instructions reviewed with patient by:gray  Clearances: n/a     Wilver Gillespie added to 6/24/22 at [facility]  Thank you!

## 2022-06-23 ENCOUNTER — TELEPHONE (OUTPATIENT)
Dept: OTHER | Facility: OTHER | Age: 22
End: 2022-06-23

## 2022-06-23 NOTE — TELEPHONE ENCOUNTER
Patient calling to speak with Leanna Barnett  She needs to reschedule procedure they scheduled earlier today

## 2022-06-23 NOTE — TELEPHONE ENCOUNTER
Pt called in stating she was Dx with COVID on 6 17 and wanted to make sure she could still have her Endoscopy tomorrow  Pt is fully vaccinated and with her booster  She is asymptomatic  Please call pt to verify she can still have her endo done tomorrow or not

## 2022-06-30 ENCOUNTER — TELEPHONE (OUTPATIENT)
Dept: GASTROENTEROLOGY | Facility: CLINIC | Age: 22
End: 2022-06-30

## 2022-06-30 NOTE — TELEPHONE ENCOUNTER
----- Message from Milagro Rich sent at 6/30/2022  3:08 PM EDT -----  Regarding: MRI results  Hi! I had my MRI done today  It was not done thought Jay Devine 15  I was able to get in earlier at the normal facility I go to for imaging  Should I make a f/u apt to go over results? I have the disk with me   Let me know!!!

## 2022-06-30 NOTE — TELEPHONE ENCOUNTER
I called and spoke with patient and she stated she went to [facility] for the MRI today  I will call and get the results  Patient stated she would wait to hear from us with the results before deciding whether or not she would like to come in for an office appt prior to her EGD on 7/25